# Patient Record
Sex: MALE | Race: WHITE | NOT HISPANIC OR LATINO | Employment: OTHER | URBAN - METROPOLITAN AREA
[De-identification: names, ages, dates, MRNs, and addresses within clinical notes are randomized per-mention and may not be internally consistent; named-entity substitution may affect disease eponyms.]

---

## 2019-10-04 ENCOUNTER — TRANSFERRED RECORDS (OUTPATIENT)
Dept: HEALTH INFORMATION MANAGEMENT | Facility: CLINIC | Age: 79
End: 2019-10-04

## 2023-08-08 ENCOUNTER — TRANSCRIBE ORDERS (OUTPATIENT)
Dept: OTHER | Age: 83
End: 2023-08-08

## 2023-08-08 DIAGNOSIS — C61 PROSTATE CANCER METASTATIC TO MULTIPLE SITES (H): Primary | ICD-10-CM

## 2023-08-09 ENCOUNTER — PATIENT OUTREACH (OUTPATIENT)
Dept: ONCOLOGY | Facility: CLINIC | Age: 83
End: 2023-08-09
Payer: COMMERCIAL

## 2023-08-09 NOTE — PROGRESS NOTES
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 (H)   Order: Adult Oncology/Hematology  Referral    [facility]      Comment: VIA:fax   DX:prostate cancer metastatic to multiple sites   TO: Med onc   NOTE:   PER:Maria Teresa Vang   RECORDS:[facility]      ASSESSMENT      Clinical History (per Nurse review of records provided):    83 year old male patient with metastatic prostate cancer referred for consideration of tx options including Pluvicto  Lives in [facility]     Records Location: [facility] Everywhere -  [facility]  Pertinent labs -- BOOKMARKED  Pertinent pathology report(s) -- BOOKMARKED  Pertinent imaging -- BOOKMARKED  Referring provider note(s)-- BOOKMARKED    INTERVENTION(S)                                                      August 9, 2023  OUTGOING CALL to pt, no answer. Left voicemail introducing my role as nurse navigator with [facility] oncology clinics and that we have recd the referral , ready to schedule. Requested callback to number below (Mon - Fri 8am - 4:30pm) to speak to a  to set the consult date/time/location. Explained to pt that he will also receive a call from our scheduling intake team in the next 1-2 business days     -Referral Triage Scheduling Instructions added to Hem/Onc  referral for Patient Access rep    PLAN                                                      Medical Oncology () consult     See records team's Pre-Visit encounter documentation for additional records retrieval information.    Lorin Wagner, RN, BSN, OCN  Hematology/Oncology New Patient Nurse Navigator   [facility]  [phone]

## 2023-08-14 NOTE — TELEPHONE ENCOUNTER
RECORDS STATUS - ALL OTHER DIAGNOSIS      Records Requested     August 14, 2023 3:12 PM  [id number]   Facility  Images - [facility] fx. [id number]    [facility] - [facility]  [address] [place] [address]  . [phone] / fx. [id number]   Outcome Sent a fax for images to be pushed and path send out - Amay     8/16/23 1PM received a call from Bella at [facility] Path, they are needing a new fedex label, faxed over a new label and updated previsit. 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/ ref gonsalo Henriquez/ Sched per Pt     RECORDS RECEIVED FROM: [facility]   DATE RECEIVED:    NOTES STATUS DETAILS   OFFICE NOTE from referring provider Care everywhere  [facility]   8/7/23 Maria Teresa Bonilla, TAZ      MEDICATION LIST Care everywhere     LABS     PATHOLOGY REPORTS Care everywhere  [facility]   10/4/19 LYMPH NODE, LEFT NECK, CORE BIOPSY : [id number]  *LEUKEMIA IMMUNOPHENOTYPE, TISSUE performed at [place]      FEDEX label :  [id number]   ANYTHING RELATED TO DIAGNOSIS Care everywhere     GENONOMIC TESTING     TYPE: N/A    IMAGING (NEED IMAGES & REPORT)     [facility]  Report: care everywhere   Images: req 8/14/23 - received  NM PET CT Prostate: 7/6/23  NM Bone Scan: 1/26/23 - 11/20/19  CT ABD PELVIS: 1/26/23 - 11/20/19  CT NECK: 9/26/19  US Bx Lymph node: 10/4/19

## 2023-08-17 NOTE — TELEPHONE ENCOUNTER
Pathology Received August 17, 2023 1:33 PM  [id number]     Action Taken Pathology received from [facility] and sent to 5th floor path lab to be reviewed with filled out pathology consult form.     Path: 10/4/19 LYMPH NODE, LEFT NECK, CORE BIOPSY : [id number]

## 2023-08-18 ENCOUNTER — LAB REQUISITION (OUTPATIENT)
Dept: LAB | Facility: CLINIC | Age: 83
End: 2023-08-18
Payer: COMMERCIAL

## 2023-08-18 LAB
PATH REPORT.COMMENTS IMP SPEC: ABNORMAL
PATH REPORT.COMMENTS IMP SPEC: YES
PATH REPORT.FINAL DX SPEC: ABNORMAL
PATH REPORT.GROSS SPEC: ABNORMAL
PATH REPORT.MICROSCOPIC SPEC OTHER STN: ABNORMAL
PATH REPORT.RELEVANT HX SPEC: ABNORMAL
PATH REPORT.RELEVANT HX SPEC: ABNORMAL
PATH REPORT.SITE OF ORIGIN SPEC: ABNORMAL

## 2023-08-18 PROCEDURE — 88321 CONSLTJ&REPRT SLD PREP ELSWR: CPT | Performed by: PATHOLOGY

## 2023-08-24 ENCOUNTER — PATIENT OUTREACH (OUTPATIENT)
Dept: ONCOLOGY | Facility: CLINIC | Age: 83
End: 2023-08-24
Payer: COMMERCIAL

## 2023-08-24 ENCOUNTER — PRE VISIT (OUTPATIENT)
Dept: ONCOLOGY | Facility: CLINIC | Age: 83
End: 2023-08-24
Payer: COMMERCIAL

## 2023-08-24 ENCOUNTER — ONCOLOGY VISIT (OUTPATIENT)
Dept: ONCOLOGY | Facility: CLINIC | Age: 83
End: 2023-08-24
Payer: COMMERCIAL

## 2023-08-24 VITALS
BODY MASS INDEX: 20.38 KG/M2 | OXYGEN SATURATION: 97 % | DIASTOLIC BLOOD PRESSURE: 72 MMHG | RESPIRATION RATE: 16 BRPM | HEIGHT: 66 IN | TEMPERATURE: 97.9 F | SYSTOLIC BLOOD PRESSURE: 128 MMHG | WEIGHT: 126.8 LBS | HEART RATE: 70 BPM

## 2023-08-24 DIAGNOSIS — C61 PROSTATE CANCER (H): Primary | ICD-10-CM

## 2023-08-24 LAB
ALBUMIN SERPL BCG-MCNC: 4.8 G/DL (ref 3.5–5.2)
ALP SERPL-CCNC: 64 U/L (ref 40–129)
ALT SERPL W P-5'-P-CCNC: 15 U/L (ref 0–70)
ANION GAP SERPL CALCULATED.3IONS-SCNC: 11 MMOL/L (ref 7–15)
AST SERPL W P-5'-P-CCNC: 28 U/L (ref 0–45)
BASOPHILS # BLD AUTO: 0 10E3/UL (ref 0–0.2)
BASOPHILS NFR BLD AUTO: 1 %
BILIRUB SERPL-MCNC: 0.4 MG/DL
BUN SERPL-MCNC: 18.7 MG/DL (ref 8–23)
CALCIUM SERPL-MCNC: 9.9 MG/DL (ref 8.8–10.2)
CHLORIDE SERPL-SCNC: 102 MMOL/L (ref 98–107)
CREAT SERPL-MCNC: 0.99 MG/DL (ref 0.67–1.17)
DEPRECATED HCO3 PLAS-SCNC: 26 MMOL/L (ref 22–29)
EOSINOPHIL # BLD AUTO: 0.1 10E3/UL (ref 0–0.7)
EOSINOPHIL NFR BLD AUTO: 2 %
ERYTHROCYTE [DISTWIDTH] IN BLOOD BY AUTOMATED COUNT: 13 % (ref 10–15)
GFR SERPL CREATININE-BSD FRML MDRD: 77 ML/MIN/1.73M2
GLUCOSE SERPL-MCNC: 109 MG/DL (ref 70–99)
HCT VFR BLD AUTO: 43.6 % (ref 40–53)
HGB BLD-MCNC: 15 G/DL (ref 13.3–17.7)
IMM GRANULOCYTES # BLD: 0 10E3/UL
IMM GRANULOCYTES NFR BLD: 0 %
LYMPHOCYTES # BLD AUTO: 0.8 10E3/UL (ref 0.8–5.3)
LYMPHOCYTES NFR BLD AUTO: 15 %
MCH RBC QN AUTO: 31.6 PG (ref 26.5–33)
MCHC RBC AUTO-ENTMCNC: 34.4 G/DL (ref 31.5–36.5)
MCV RBC AUTO: 92 FL (ref 78–100)
MONOCYTES # BLD AUTO: 0.3 10E3/UL (ref 0–1.3)
MONOCYTES NFR BLD AUTO: 5 %
NEUTROPHILS # BLD AUTO: 3.8 10E3/UL (ref 1.6–8.3)
NEUTROPHILS NFR BLD AUTO: 77 %
NRBC # BLD AUTO: 0 10E3/UL
NRBC BLD AUTO-RTO: 0 /100
PLATELET # BLD AUTO: 172 10E3/UL (ref 150–450)
POTASSIUM SERPL-SCNC: 4.3 MMOL/L (ref 3.4–5.3)
PROT SERPL-MCNC: 7 G/DL (ref 6.4–8.3)
RBC # BLD AUTO: 4.74 10E6/UL (ref 4.4–5.9)
SODIUM SERPL-SCNC: 139 MMOL/L (ref 136–145)
WBC # BLD AUTO: 5 10E3/UL (ref 4–11)

## 2023-08-24 PROCEDURE — 85025 COMPLETE CBC W/AUTO DIFF WBC: CPT | Performed by: INTERNAL MEDICINE

## 2023-08-24 PROCEDURE — 36415 COLL VENOUS BLD VENIPUNCTURE: CPT

## 2023-08-24 PROCEDURE — 99204 OFFICE O/P NEW MOD 45 MIN: CPT | Mod: GC | Performed by: INTERNAL MEDICINE

## 2023-08-24 PROCEDURE — 84153 ASSAY OF PSA TOTAL: CPT | Performed by: INTERNAL MEDICINE

## 2023-08-24 PROCEDURE — 36415 COLL VENOUS BLD VENIPUNCTURE: CPT | Performed by: INTERNAL MEDICINE

## 2023-08-24 PROCEDURE — 80053 COMPREHEN METABOLIC PANEL: CPT | Performed by: INTERNAL MEDICINE

## 2023-08-24 PROCEDURE — G0463 HOSPITAL OUTPT CLINIC VISIT: HCPCS | Performed by: INTERNAL MEDICINE

## 2023-08-24 ASSESSMENT — PAIN SCALES - GENERAL: PAINLEVEL: NO PAIN (0)

## 2023-08-24 NOTE — PROGRESS NOTES
[facility]  New patient history and physical  2023    Diagnosis: Metastatic castration resistant prostate cancer  Other heme/onc providers: Dr. Maria Teresa Vang ([facility])  Current Treatment and intent: referred for palliative 5th line Ksenia-177-PSMA  Treatment Summary:   20 to present: leuprolide q3m  20 to present: zoledronic acid monthly  20 to 20: docetaxel x11 cycles (PSA 10,824 ? 16.47)  21 to 22: bicalutamide  22 to 22: enzalutamide  22 to 23: cabazitaxel, carboplatin, prednisone x9 cycles    Assessment and Plan:   Liborio Lira is a 83 year old male with mCRPC referred for consideration of treatmet with Ksenia-177-PSMA.    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  This last chemotherapy regimen was tough on him and he ultimately elected to discontinue all treatment.  However, he is on board with a plan to return to his local oncologist and resume ADT with leuprolide and monthly zoledronic acid.  We also discussed his remaining lines of treatment including lutetium-177-PSMA, radium-223, and possibly some targeted treatments if his cancer is particular molecular features (PARP inhibitor if HHR, immune checkpoint inhibitor if deficient MMR).  At all of these, I recommended lutetium-177-PSMA as the likely next most effective and least toxic palliative treatment.  We  reviewed the mechanism of action of targeted radiation delivery, the possible side effects including but not limited to myelosuppression, fatigue, nausea, dry mouth.  He and his son asked excellent questions and are on board with initiating treatment.    Plan:  Labs today, CBC, CMP, PSA  Start Ksenia-177-PSMA as soon as able  Recommended continuing leuprolide q3m with his primary oncologist in [place]  Send Caris testing to evaluate for HHR and MMR/PDL1/TMB to determine later line therapy options  RTC with me TBD when infusions are able to be scheduled    This plan was discussed with Dr. Mcdonald.     Faby Toro MD   PGY6 Hematology/Oncology Fellow       Hem/onc History:   Edit Oncology History  Oncology History Overview Note   Jeannette initially presented to his PCP with a lump on the left side of his neck on 9/16/2019.  He underwent a CT of the neck 9/20/19 that showed left cervical adenopathy which was subsequently biopsied 10/4/2019 with results consistent with metastatic prostate adenocarcinoma.  At that time, his PSA was 8,970 on 10/10/2019.  He subsequently underwent a bone scan and CT AP on 11/20/2019 that showed widespread bony metastatic disease and a large pelvic mass directly invading the urinary bladder with bulky retroperitoneal and pelvic sidewall lymphadenopathy.    He started first-line palliative treatment with leuprolide and docetaxel on 1/29/2020 and was treated with 11 total cycles of docetaxel ending on 8/28/2020. A restaging CT CAP and bone scan 9/16/20 showed apparent resolution of the invasive prostate mass and lymphadenopathy, and improved but persistent diffuse osseous metastatic disease. His PSA decreased from a peak of 10,824 down to a sherrie of 16.47 on 6/9/2021 in response to this treatment.      His PSA then slowly drifted up to 30.97 on 11/12/2021 at which time palliative second line bicalutamide was added to leuprolide.  His PSA unfortunately continued to rise to 52.30 on 2/14/2022 for  which she was switched from bicalutamide to palliative third line enzalutamide.  His PSA decreased to a sherrie of 16.83 on 7/15/2022 in response to enzalutamide, then started to rise again to 28.72 on 8/11/2022 for which he was changed from enzalutamide to palliative fourth line cabazitaxel, carboplatin, and prednisone.  He received a total of 9 cycles of cabazitaxel, carboplatin, and prednisone ending on 4/24/2023 during which time his PSA was stable in the 40-50 range, however started to increase to the 70-90 range after completing chemotherapy.  He therefore underwent a PSMA PET-CT scan on 7/6/2023 that showed hypermetabolic activity in the majority the prostate and widespread osseous metastatic disease for which she was referred to [facility] for lutetium-177-PSMA therapy.     Prostate cancer (H)   10/4/2019 Pathology    Left neck lymph node biopsy  [facility] overread:  Final Diagnosis   CASE FROM [place], [place] ([id number], OBTAINED 10/04/2019):  LYMPH NODE, LEFT NECK, CORE BIOPSY:  -METASTATIC PROSTATE ADENOCARCINOMA, see comment.      10/10/2019 Tumor Markers    Date PSA Treatment   10/10/2019 8,970.00 High      1/29/2020 10,824.68 High  Started leuprolide, docetaxel    2/19/2020 3,497.46 High       3/11/2020 1,256.01 High       4/2/2020 727.72 High     4/24/2020 633.57 High     5/15/2020 471.45 High     6/4/2020 332.91 High     6/26/2020 229.82 High     7/17/2020 185.36 High     8/7/2020 149.70 High     8/28/2020 124.63 High  Docetaxel x11 cycles completed   12/4/2020 42.69 High     3/15/2021 21.50 High     4/12/2021 17.52 High     5/14/2021 16.84 High     6/9/2021 16.47 High     8/20/2021 19.90 High     9/20/2021 22.72 High     10/20/2021 29.24 High     11/12/2021 30.97 High  Added bicalutamide to leuprolide   12/16/2021 29.96 High     1/18/2022 35.33 High     2/14/2022 52.30 High  Changed bicalutamide to enzalutamide   4/22/2022 19.22 High     5/20/2022 17.88 High     6/17/2022 17.92 High     7/15/2022 16.83  High     8/12/2022 19.11 High     9/12/2022 22.04 High     10/11/2022 25.04 High     11/8/2022 28.72 High  Changed enzalutamide to cabazitaxel, carboplatin   11/29/2022 42.73 High     12/20/2022 53.16 High     1/10/2023 52.72 High     1/31/2023 47.75 High     3/14/2023 47.78 High     4/3/2023 50.89 High     4/24/2023 93.83 High  Cabazitaxel, carboplatin x9 cycles completed   5/15/2023 73.02 High     6/26/2023 81.78 High     8/7/2023 117.08 High          7/6/2023 Imaging    PSMA PET  IMPRESSION:   1. Hypermetabolic activity involving a majority of the prostate concerning for   residual disease.   2. Hypermetabolic activity involving numerous osseous structures as discussed   above consistent with metastatic disease.      8/31/2023 -  Chemotherapy    IP ONC Prostate Cancer (PMSA+) - Lutetium Ksenia-177 vipivotide tetraxetan  Plan Provider: Faby Toro MD  Treatment goal: Palliative  Line of treatment: [No plan line of treatment]       History of Present Illness/Interval History:   Jeannette presented to clinic today with his son Natanael.  He reports he has been doing very well since stopping all therapy in April 2023.  Tells me he felt very rundown with the chemotherapy regimen of cabazitaxel and carboplatin, and though he was able to make it through 9 cycles he decided to stop this because it was affecting his quality of life.  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  His son Natanael is a  and has no interest in buying his dairy farm, and for now he is not planning on selling anyone else.  He tells me he is very careful to avoid falls given his known bony metastatic disease and risk of fracture, unfortunately he has not had any potential pain or fractures thus far.  He does note  "that he stopped his leuprolide and Zometa    Subjective   Past Medical History:   Diagnosis Date    Prostate cancer (H) 08/24/2023     Past Surgical History:   Procedure Laterality Date    IR LYMPH NODE BIOPSY  10/4/2019     Social History     Socioeconomic History    Marital status: Single     Spouse name: None    Number of children: None    Years of education: None    Highest education level: None   Tobacco Use    Smoking status: Never     Passive exposure: Never    Smokeless tobacco: Current     Types: Chew, Snuff   Substance and Sexual Activity    Alcohol use: Yes    Drug use: Never      No family history on file.    No current outpatient medications on file.     No current facility-administered medications for this visit.        Objective     Physical Exam:   Blood pressure 128/72, pulse 70, temperature 97.9  F (36.6  C), temperature source Oral, resp. rate 16, height 1.67 m (5' 5.75\"), weight 57.5 kg (126 lb 12.8 oz), SpO2 97 %.  Physical Exam  Constitutional:       General: He is not in acute distress.     Appearance: He is not ill-appearing.   HENT:      Head: Normocephalic and atraumatic.      Mouth/Throat:      Mouth: Mucous membranes are moist.   Eyes:      General: No scleral icterus.     Extraocular Movements: Extraocular movements intact.   Cardiovascular:      Rate and Rhythm: Normal rate and regular rhythm.   Pulmonary:      Effort: Pulmonary effort is normal. No respiratory distress.   Abdominal:      General: There is no distension.      Palpations: Abdomen is soft.   Musculoskeletal:      Right lower leg: No edema.      Left lower leg: No edema.   Skin:     General: Skin is warm and dry.      Coloration: Skin is not jaundiced.   Neurological:      Mental Status: He is alert. Mental status is at baseline.   Psychiatric:         Mood and Affect: Mood normal.         Data:     I personally reviewed the following studies:  Recent Labs   Lab Test 08/24/23  1651   WBC 5.0   % NEUTROPHILS 77   HEMOGLOBIN " 15.0   PLATELET COUNT 172     No lab results found.  No lab results found.  No lab results found.      Data from Care Everywhere:  Date PSA Treatment   10/10/2019 8,970.00 High      1/29/2020 10,824.68 High  Started leuprolide, docetaxel    2/19/2020 3,497.46 High       3/11/2020 1,256.01 High       4/2/2020 727.72 High     4/24/2020 633.57 High     5/15/2020 471.45 High     6/4/2020 332.91 High     6/26/2020 229.82 High     7/17/2020 185.36 High     8/7/2020 149.70 High     8/28/2020 124.63 High  Docetaxel x11 cycles completed   12/4/2020 42.69 High     3/15/2021 21.50 High     4/12/2021 17.52 High     5/14/2021 16.84 High     6/9/2021 16.47 High     8/20/2021 19.90 High     9/20/2021 22.72 High     10/20/2021 29.24 High     11/12/2021 30.97 High  Added bicalutamide to leuprolide   12/16/2021 29.96 High     1/18/2022 35.33 High     2/14/2022 52.30 High  Changed bicalutamide to enzalutamide   4/22/2022 19.22 High     5/20/2022 17.88 High     6/17/2022 17.92 High     7/15/2022 16.83 High     8/12/2022 19.11 High     9/12/2022 22.04 High     10/11/2022 25.04 High     11/8/2022 28.72 High  Changed enzalutamide to cabazitaxel, carboplatin   11/29/2022 42.73 High     12/20/2022 53.16 High     1/10/2023 52.72 High     1/31/2023 47.75 High     3/14/2023 47.78 High     4/3/2023 50.89 High     4/24/2023 93.83 High  Cabazitaxel, carboplatin x9 cycles completed   5/15/2023 73.02 High     6/26/2023 81.78 High     8/7/2023 117.08 High       Labs from 8/7/23 in care everywhere  COMPLETE BLOOD COUNT AND DIFFERENTIAL  Specimen: Blood - Venous blood (substance)   Ref Range & Units 2 wk ago   WBC Count, Corrected 3.9 - 11.9 10(3)/uL 6.0   RBC Count 4.08 - 5.79 10(6)/uL 4.51   Hemoglobin 13.1 - 17.1 g/dL 14.6   Hematocrit 38.7 - 51.4 % 42.0   MCV 82.9 - 100.6 fL 93.1   MCH 27.6 - 33.2 pg 32.4   MCHC 32.0 - 36.0 g/dL 34.8   RDW 10.0 - 16.2 % 13.2   Platelets 179 - 450 10(3)/uL 173 Low    MPV 7.4 - 10.4 fL 6.9 Low    % Neutrophils 43.0  - 80.0 % 79.6   % Lymphocytes 16.0 - 49.0 % 13.4 Low    % Monocytes 0.0 - 10.0 % 5.3   % Eosinophils 0.0 - 7.0 % 0.9   % Basophils 0.0 - 2.0 % 0.8   Abs Neutrophils 1.6 - 8.1 10(3)/uL 4.8   Abs Lymphocytes 0.9 - 3.5 10(3)/uL 0.8 Low    Abs Monocytes 0.0 - 1.1 10(3)/uL 0.3   Abs Eosinophils 0.0 - 0.8 10(3)/uL 0.1   Abs Basophils 0.0 - 0.2 10(3)/uL 0.1     COMPREHENSIVE METABOLIC PANEL  Specimen: Blood - Venous blood (substance)     Ref Range & Units 2 wk ago Comments   Sodium 136 - 146 mmol/L 139    Potassium 3.5 - 5.1 mmol/L 4.2    Chloride 98 - 107 mmol/L 105    CO2 22 - 29 mmol/L 23    Anion Gap 10.0 - 20.0 mmol/L 15.2    Creatinine 0.72 - 1.25 mg/dL 0.95    Blood Urea Nitrogen 10.0 - 20.0 mg/dL 16.3    BUN/Creatinine Ratio 11.70 - 22.90 ratio 17.16    Calcium 8.6 - 10.5 mg/dL 9.5    Glucose 70 - 100 mg/dL 77    eGFR >=60 mL/min/1.73m(2) >60 Calculation based on the Chronic Kidney Disease Epidemiology Collaboration (CKD-EPI) equation refit without adjustment for race.  GFR reference range is not established in patients >70 years old.   Total Protein 6.0 - 8.0 g/dL 7.0    Albumin 3.4 - 4.8 g/dL 4.5    Globulin 2.0 - 3.5 g/dL 2.5    Albumin/Globulin Ratio 1.0 - 2.0 1.8    Aspartate Aminotransferase (AST) 5 - 41 U/L 32    Alanine Aminotransferase (ALT) 8 - 45 U/L 25    Alkaline Phosphatase 50 - 136 U/L 63    Bilirubin, Total 0.2 - 1.2 mg/dL 0.5    eCrCl (Rx) - Adults mL/min 50.5    Fasting Status  No        No results found for this or any previous visit (from the past 24 hour(s)).

## 2023-08-24 NOTE — PROGRESS NOTES
[facility]: Cancer Care Initial Note                                    Discussion with Patient:                                                      Introduced self and role of RN Care Coordinator at [facility]. Provided my contact information, [facility] phone number (which has options to talk with a Nurse available 24/7 - triage and RNCC via this option during business hours).     Reviewed [facility] care team members including midlevel providers in oncology dept and Dr. Toro and Dr. Mcdonald  usual clinic hours.    Consent to communicate form signed and sent to scanning    Pt voiced understanding and appreciation of above information and denies any further questions, and he/she understands that I will follow and provide coordination as needed       Assessment:                                                      Initial  Current living arrangement:: I live alone  Informal Support system:: Children  Mobility Status: Independent  Transportation means:: Regular car  Medication adherence problem (GOAL):: No  Medication side effects suspected:: No  Pain Score: No Pain (0)      Intervention/Education provided during outreach:                                                     See above    Patient to follow up as scheduled at next appt    Yohana ADHIKARI, RN   Care Coordinator  [facility]

## 2023-08-25 LAB — PSA SERPL DL<=0.01 NG/ML-MCNC: 173.5 NG/ML

## 2023-08-28 ENCOUNTER — PATIENT OUTREACH (OUTPATIENT)
Dept: ONCOLOGY | Facility: CLINIC | Age: 83
End: 2023-08-28
Payer: COMMERCIAL

## 2023-08-28 NOTE — PROGRESS NOTES
[facility]: Cancer Care                                                                                        RN sent for Caris testing       Yohana LUCION, RN   Care Coordinator  [facility]

## 2023-08-30 ENCOUNTER — TELEPHONE (OUTPATIENT)
Dept: NUCLEAR MEDICINE | Facility: CLINIC | Age: 83
End: 2023-08-30
Payer: COMMERCIAL

## 2023-08-31 DIAGNOSIS — C61 PROSTATE CANCER (H): Primary | ICD-10-CM

## 2023-08-31 RX ORDER — ONDANSETRON 4 MG/1
8 TABLET, FILM COATED ORAL
Status: CANCELLED | OUTPATIENT
Start: 2023-08-31

## 2023-08-31 RX ORDER — EPINEPHRINE 1 MG/ML
0.3 INJECTION, SOLUTION INTRAMUSCULAR; SUBCUTANEOUS EVERY 5 MIN PRN
Status: CANCELLED | OUTPATIENT
Start: 2023-08-31

## 2023-08-31 RX ORDER — PROCHLORPERAZINE MALEATE 5 MG
5-10 TABLET ORAL EVERY 6 HOURS PRN
Status: CANCELLED
Start: 2023-08-31

## 2023-08-31 RX ORDER — METHYLPREDNISOLONE SODIUM SUCCINATE 125 MG/2ML
125 INJECTION, POWDER, LYOPHILIZED, FOR SOLUTION INTRAMUSCULAR; INTRAVENOUS
Status: CANCELLED
Start: 2023-08-31

## 2023-08-31 RX ORDER — LORAZEPAM 2 MG/ML
.5-1 INJECTION INTRAMUSCULAR EVERY 6 HOURS PRN
Status: CANCELLED | OUTPATIENT
Start: 2023-08-31

## 2023-08-31 RX ORDER — LORAZEPAM 0.5 MG/1
.5-1 TABLET ORAL EVERY 6 HOURS PRN
Status: CANCELLED
Start: 2023-08-31

## 2023-08-31 RX ORDER — DIPHENHYDRAMINE HYDROCHLORIDE 50 MG/ML
50 INJECTION INTRAMUSCULAR; INTRAVENOUS
Status: CANCELLED
Start: 2023-08-31

## 2023-08-31 RX ORDER — MEPERIDINE HYDROCHLORIDE 25 MG/ML
25 INJECTION INTRAMUSCULAR; INTRAVENOUS; SUBCUTANEOUS EVERY 30 MIN PRN
Status: CANCELLED | OUTPATIENT
Start: 2023-08-31

## 2023-08-31 RX ORDER — ALBUTEROL SULFATE 90 UG/1
1-2 AEROSOL, METERED RESPIRATORY (INHALATION)
Status: CANCELLED
Start: 2023-08-31

## 2023-08-31 RX ORDER — ALBUTEROL SULFATE 0.83 MG/ML
2.5 SOLUTION RESPIRATORY (INHALATION)
Status: CANCELLED | OUTPATIENT
Start: 2023-08-31

## 2023-09-13 ENCOUNTER — PATIENT OUTREACH (OUTPATIENT)
Dept: ONCOLOGY | Facility: CLINIC | Age: 83
End: 2023-09-13
Payer: COMMERCIAL

## 2023-09-13 DIAGNOSIS — C61 PROSTATE CANCER (H): Primary | ICD-10-CM

## 2023-09-15 ENCOUNTER — ALLIED HEALTH/NURSE VISIT (OUTPATIENT)
Dept: ONCOLOGY | Facility: CLINIC | Age: 83
End: 2023-09-15

## 2023-09-15 ENCOUNTER — HOSPITAL ENCOUNTER (OUTPATIENT)
Dept: NUCLEAR MEDICINE | Facility: CLINIC | Age: 83
Setting detail: NUCLEAR MEDICINE
Discharge: HOME | End: 2023-09-15
Attending: INTERNAL MEDICINE | Admitting: INTERNAL MEDICINE
Payer: COMMERCIAL

## 2023-09-15 DIAGNOSIS — C61 PROSTATE CA (H): Primary | ICD-10-CM

## 2023-09-15 DIAGNOSIS — C61 PROSTATE CANCER (H): Primary | ICD-10-CM

## 2023-09-15 PROCEDURE — A9607 HC RX 344: HCPCS | Mod: JZ | Performed by: INTERNAL MEDICINE

## 2023-09-15 PROCEDURE — 79101 NUCLEAR RX IV ADMIN: CPT

## 2023-09-15 PROCEDURE — 79101 NUCLEAR RX IV ADMIN: CPT | Mod: 26 | Performed by: RADIOLOGY

## 2023-09-15 PROCEDURE — 344N000001 HC RX 344: Mod: JZ | Performed by: INTERNAL MEDICINE

## 2023-09-15 PROCEDURE — 999N000128 HC STATISTIC PERIPHERAL IV START W/O US GUIDANCE

## 2023-09-15 RX ORDER — LORAZEPAM 0.5 MG/1
.5-1 TABLET ORAL EVERY 6 HOURS PRN
Status: DISCONTINUED | OUTPATIENT
Start: 2023-09-15 | End: 2023-09-16 | Stop reason: HOSPADM

## 2023-09-15 RX ORDER — LORAZEPAM 2 MG/ML
.5-1 INJECTION INTRAMUSCULAR EVERY 6 HOURS PRN
Status: DISCONTINUED | OUTPATIENT
Start: 2023-09-15 | End: 2023-09-16 | Stop reason: HOSPADM

## 2023-09-15 RX ORDER — EPINEPHRINE 1 MG/ML
0.3 INJECTION, SOLUTION, CONCENTRATE INTRAVENOUS EVERY 5 MIN PRN
Status: DISCONTINUED | OUTPATIENT
Start: 2023-09-15 | End: 2023-09-16 | Stop reason: HOSPADM

## 2023-09-15 RX ORDER — ONDANSETRON 8 MG/1
8 TABLET, FILM COATED ORAL
Status: DISCONTINUED | OUTPATIENT
Start: 2023-09-15 | End: 2023-09-16 | Stop reason: HOSPADM

## 2023-09-15 RX ORDER — ALBUTEROL SULFATE 0.83 MG/ML
2.5 SOLUTION RESPIRATORY (INHALATION)
Status: DISCONTINUED | OUTPATIENT
Start: 2023-09-15 | End: 2023-09-16 | Stop reason: HOSPADM

## 2023-09-15 RX ORDER — METHYLPREDNISOLONE SODIUM SUCCINATE 125 MG/2ML
125 INJECTION, POWDER, LYOPHILIZED, FOR SOLUTION INTRAMUSCULAR; INTRAVENOUS
Status: DISCONTINUED | OUTPATIENT
Start: 2023-09-15 | End: 2023-09-16 | Stop reason: HOSPADM

## 2023-09-15 RX ORDER — MEPERIDINE HYDROCHLORIDE 25 MG/ML
25 INJECTION INTRAMUSCULAR; INTRAVENOUS; SUBCUTANEOUS EVERY 30 MIN PRN
Status: DISCONTINUED | OUTPATIENT
Start: 2023-09-15 | End: 2023-09-16 | Stop reason: HOSPADM

## 2023-09-15 RX ORDER — DIPHENHYDRAMINE HYDROCHLORIDE 50 MG/ML
50 INJECTION INTRAMUSCULAR; INTRAVENOUS
Status: DISCONTINUED | OUTPATIENT
Start: 2023-09-15 | End: 2023-09-16 | Stop reason: HOSPADM

## 2023-09-15 RX ORDER — PROCHLORPERAZINE MALEATE 5 MG
5 TABLET ORAL EVERY 6 HOURS PRN
Status: DISCONTINUED | OUTPATIENT
Start: 2023-09-15 | End: 2023-09-16 | Stop reason: HOSPADM

## 2023-09-15 RX ORDER — ALBUTEROL SULFATE 90 UG/1
1-2 AEROSOL, METERED RESPIRATORY (INHALATION)
Status: DISCONTINUED | OUTPATIENT
Start: 2023-09-15 | End: 2023-09-16 | Stop reason: HOSPADM

## 2023-09-15 RX ADMIN — LUTETIUM LU 177 VIPIVOTIDE TETRAXETAN 200 MILLICURIE: 27 INJECTION, SOLUTION INTRAVENOUS at 14:56

## 2023-09-25 NOTE — PROGRESS NOTES
I had an opportunity to speak with Mr. Lira today regarding the Astrin CTC study.      The consent form, including purpose, risks and benefits, was reviewed with , and all questions were answered before he signed the consent form. The patient consented and understands the study involves up to three serial 50 mL blood draws.       I, Donald Adams, discussed the study with the patient, going through the informed consent form page by page and obtained consent. A copy of the signed form was provided to the patient. No procedures specific to this study were performed prior to the patient signing the consent form.     Elligibility was determined and signed off by Donald Adams prior to blood collection.       Consent and combined HIPAA Version Date: 16 AUG 22 [facility] IRB approval: 4 APR 2023     Consent and combined HIPAA obtained by: Donald Adams   Date: 15 SEP 23

## 2023-10-23 NOTE — PROGRESS NOTES
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 Maria Teresa Vang ([facility])  Current Treatment and intent: referred for palliative 5th line Ksenia-177-PSMA    Treatment Summary:   1/29/20 to present: leuprolide q3m  1/29/20 to present: zoledronic acid monthly  1/29/20 to 8/28/20: docetaxel x11 cycles (PSA 10,824 ? 16.47)  11/16/21 to 2/14/22: bicalutamide  2/17/22 to 11/8/22: enzalutamide  11/8/22 to 4/24/23: cabazitaxel, carboplatin, prednisone x9 cycles  9/15/23 D1C1 Pluvicto 200 millicurie    Hem/onc History:   Edit Oncology History  Oncology History Overview Note   Jeannette initially presented to his PCP with a lump on the left side of his neck on 9/16/2019.  He underwent a CT of the neck 9/20/19 that showed left cervical adenopathy which was subsequently biopsied 10/4/2019 with results consistent with metastatic prostate adenocarcinoma.  At that time, his PSA was 8,970 on 10/10/2019.  He subsequently underwent a bone scan and CT AP on 11/20/2019 that showed widespread bony metastatic disease and a large pelvic mass directly invading the urinary bladder with bulky retroperitoneal and pelvic sidewall lymphadenopathy.    He started first-line palliative treatment with leuprolide and docetaxel on 1/29/2020 and was treated with 11 total cycles of docetaxel ending on 8/28/2020. A restaging CT CAP and bone scan 9/16/20 showed apparent resolution of the invasive prostate mass and lymphadenopathy, and improved but persistent diffuse osseous metastatic disease. His PSA decreased from a peak of 10,824 down to a sherrie of 16.47 on 6/9/2021 in response to this treatment.      His PSA then slowly drifted up to 30.97 on 11/12/2021 at which time palliative second line bicalutamide was added to leuprolide.  His PSA  unfortunately continued to rise to 52.30 on 2/14/2022 for which she was switched from bicalutamide to palliative third line enzalutamide.  His PSA decreased to a sherrie of 16.83 on 7/15/2022 in response to enzalutamide, then started to rise again to 28.72 on 8/11/2022 for which he was changed from enzalutamide to palliative fourth line cabazitaxel, carboplatin, and prednisone.  He received a total of 9 cycles of cabazitaxel, carboplatin, and prednisone ending on 4/24/2023 during which time his PSA was stable in the 40-50 range, however started to increase to the 70-90 range after completing chemotherapy.  He therefore underwent a PSMA PET-CT scan on 7/6/2023 that showed hypermetabolic activity in the majority the prostate and widespread osseous metastatic disease for which she was referred to [facility] for lutetium-177-PSMA therapy.     Prostate cancer (H)   10/4/2019 Pathology    Left neck lymph node biopsy  [facility] overread:  Final Diagnosis   CASE FROM [place], [place] ([id number], OBTAINED 10/04/2019):  LYMPH NODE, LEFT NECK, CORE BIOPSY:  -METASTATIC PROSTATE ADENOCARCINOMA, see comment.      10/10/2019 Tumor Markers    Date PSA Treatment   10/10/2019 8,970.00 High      1/29/2020 10,824.68 High  Started leuprolide, docetaxel    2/19/2020 3,497.46 High       3/11/2020 1,256.01 High       4/2/2020 727.72 High     4/24/2020 633.57 High     5/15/2020 471.45 High     6/4/2020 332.91 High     6/26/2020 229.82 High     7/17/2020 185.36 High     8/7/2020 149.70 High     8/28/2020 124.63 High  Docetaxel x11 cycles completed   12/4/2020 42.69 High     3/15/2021 21.50 High     4/12/2021 17.52 High     5/14/2021 16.84 High     6/9/2021 16.47 High     8/20/2021 19.90 High     9/20/2021 22.72 High     10/20/2021 29.24 High     11/12/2021 30.97 High  Added bicalutamide to leuprolide   12/16/2021 29.96 High     1/18/2022 35.33 High     2/14/2022 52.30 High  Changed bicalutamide to enzalutamide   4/22/2022 19.22 High    "  5/20/2022 17.88 High     6/17/2022 17.92 High     7/15/2022 16.83 High     8/12/2022 19.11 High     9/12/2022 22.04 High     10/11/2022 25.04 High     11/8/2022 28.72 High  Changed enzalutamide to cabazitaxel, carboplatin   11/29/2022 42.73 High     12/20/2022 53.16 High     1/10/2023 52.72 High     1/31/2023 47.75 High     3/14/2023 47.78 High     4/3/2023 50.89 High     4/24/2023 93.83 High  Cabazitaxel, carboplatin x9 cycles completed   5/15/2023 73.02 High     6/26/2023 81.78 High     8/7/2023 117.08 High          7/6/2023 Imaging    PSMA PET  IMPRESSION:   1. Hypermetabolic activity involving a majority of the prostate concerning for   residual disease.   2. Hypermetabolic activity involving numerous osseous structures as discussed   above consistent with metastatic disease.      9/15/2023 -  Chemotherapy    IP ONC Prostate Cancer (PMSA+) - Lutetium Ksenia-177 vipivotide tetraxetan  Plan Provider: Faby Toro MD  Treatment goal: Palliative  Line of treatment: [No plan line of treatment]       History of Present Illness/Interval History:   Liborio is alone for 10/24/2023 visit and reports that he is feeling good and doing well.  He reports no issues from Pluvicto received on 9/15/203 (1st dose). His energy level has improved since stopping chemotherapy in April 2023. On chart review, he is to receive Lupron shots every 3 months. He doesn't think that he as received Lupron shot lately. He has appointment with Oncologist in [place] on 11/6/23.     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 Has complaints of right thumb \"pins and needles\" he thinks it is from milking cows.    Concerned about \"lump in vein\" on right forearm. That he thinks it was larger, but states that he bumped it against something that caused bruising initially, and then reduction in size of the \"lump.\" He has had this \"lump\" for at least 1 year. He denies pain, swelling or erythema.    He is experiencing some dry " mouth, states that he is drinking more fluids that seem to help.   Reports some left eye watering that he attributes to sinus congestion an pressure. Takes ASA and Benadryl almost daily which helps with is symptoms. No reports of dry eyes.        He denies recent falls.  He denies any new bone pain or tenderness. No nausea or vomiting. No fever or chills, no night sweats. No urinary frequency, retention or urgency; no hematuria. Denies constipation or diarrhea.    Subjective   Past Medical History:   Diagnosis Date    Prostate cancer (H) 2023     Past Surgical History:   Procedure Laterality Date    IR LYMPH NODE BIOPSY  10/4/2019     Social History     Socioeconomic History    Marital status: Single   Tobacco Use    Smoking status: Never     Passive exposure: Never    Smokeless tobacco: Current     Types: Chew, Snuff   Substance and Sexual Activity    Alcohol use: Yes    Drug use: Never      Family History   Problem Relation Age of Onset    No Known Problems Son        Current Outpatient Medications   Medication Sig    cod liver oil CAPS capsule Take 2 capsules by mouth daily    cyanocobalamin (VITAMIN B-12) 100 MCG tablet Take 100 mcg by mouth daily    diphenhydrAMINE (BENADRYL) 25 MG capsule Take 25 mg by mouth every 6 hours as needed for allergies    magnesium 250 MG tablet Take 1 tablet by mouth daily    pyridOXINE (VITAMIN B6) 100 MG TABS Take 25 mg by mouth daily    vitamin C with B complex (B COMPLEX-C) tablet Take 1 tablet by mouth daily     No current facility-administered medications for this visit.        Objective   ECO-1  Physical Exam:   Blood pressure 130/89, pulse 72, temperature 97.6  F (36.4  C), temperature source Oral, resp. rate 16, weight 58.6 kg (129 lb 1.6 oz), SpO2 96%.  Physical Exam  Constitutional:       General: He is not in acute distress.     Appearance: He is not ill-appearing.   HENT:      Head: Normocephalic and atraumatic.      Mouth/Throat:      Mouth: Mucous membranes  are moist.   Eyes:      General: No scleral icterus.     Extraocular Movements: Extraocular movements intact.   Cardiovascular:      Rate and Rhythm: Normal rate and regular rhythm.   Pulmonary:      Effort: Pulmonary effort is normal. No respiratory distress.   Abdominal:      General: There is no distension.      Palpations: Abdomen is soft.   Musculoskeletal:      Right hand: No swelling or tenderness. Normal range of motion.      Right lower leg: No edema.      Left lower leg: No edema.      Comments: Right hand: Mild thenar atrophy     Skin:     General: Skin is warm and dry.      Coloration: Skin is not jaundiced.      Comments: Right forearm: approximate 2 mm moblie mass cephalic vein; no erythema, edema or warmth noted   Neurological:      Mental Status: He is alert and oriented to person, place, and time.      Sensory: Sensation is intact.      Motor: Motor function is intact.      Gait: Gait is intact.   Psychiatric:         Mood and Affect: Mood normal.     Data:   CMP 10/16/23:  Component  Ref Range & Units 9 d ago Comments   Sodium  136 - 146 mmol/L 140    Potassium  3.5 - 5.1 mmol/L 4.1    Chloride  98 - 107 mmol/L 106    CO2  22 - 29 mmol/L 24    Anion Gap  10.0 - 20.0 mmol/L 14.1    Creatinine  0.72 - 1.25 mg/dL 0.86    Blood Urea Nitrogen  10.0 - 20.0 mg/dL 21.5 High     BUN/Creatinine Ratio  11.70 - 22.90 ratio 25.00 High     Calcium, Total  8.6 - 10.5 mg/dL 9.4    Glucose  70 - 100 mg/dL 76    eGFR  >=60 mL/min/1.73m(2) >60 Calculation based on the Chronic Kidney Disease Epidemiology Collaboration (CKD-EPI) equation refit without adjustment for race.  GFR reference range is not established in patients >70 years old.   Total Protein  6.0 - 8.0 g/dL 6.4    Albumin  3.4 - 4.8 g/dL 4.2    Globulin  2.0 - 3.5 g/dL 2.2    Albumin/Globulin Ratio  1.0 - 2.0 1.9    Aspartate Aminotransferase (AST)  5 - 41 U/L 26    Alanine Aminotransferase (ALT)  8 - 45 U/L 22    Alkaline Phosphatase  50 - 136 U/L 69     Bilirubin, Total  0.2 - 1.2 mg/dL 0.4    eCrCl (Rx) - Adults  mL/min 54.8    Fasting Status N/A        CBC 10/16/23  WBC Count, Corrected  3.9 - 11.9 10(3)/uL 5.1   RBC Count  4.08 - 5.79 10(6)/uL 4.24   Hemoglobin  13.1 - 17.1 g/dL 13.7   Hematocrit  38.7 - 51.4 % 39.3   MCV  82.9 - 100.6 fL 92.7   MCH  27.6 - 33.2 pg 32.2   MCHC  32.0 - 36.0 g/dL 34.7   RDW  10.0 - 16.2 % 14.4   Platelets  179 - 450 10(3)/uL 167 Low    MPV  7.4 - 10.4 fL 6.6 Low    % Neutrophils  43.0 - 80.0 % 79.3   % Lymphocytes  16.0 - 49.0 % 11.7 Low    % Monocytes  0.0 - 10.0 % 6.8   % Eosinophils  0.0 - 7.0 % 1.6   % Basophils  0.0 - 2.0 % 0.6   Abs Neutrophils  1.6 - 8.1 10(3)/uL 4.1   Abs Lymphocytes  0.9 - 3.5 10(3)/uL 0.6 Low    Abs Monocytes  0.0 - 1.1 10(3)/uL 0.4   Abs Eosinophils  0.0 - 0.8 10(3)/uL 0.1   Abs Basophils  0.0 - 0.2 10(3)/uL 0.0     PSA:    Component      Latest Ref Rng 8/24/2023  4:51 PM   PSA Tumor Marker      ng/mL 173.50        10/16/2023  Component  Ref Range & Units 8 d ago   PSA, Total  0.00 - 7.20 ng/mL 153.55 High    [facility] LABORATORY SERVICES [facility]     ASSESSMENT AND PLAN  Stage IV castration resistant prostate adenocarcinoma:  Liborio Lira is a 83 year old male with 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 He is doing well, with no significant side effects from first cycle of Pluvicto. He is having some dry mouth, but endorses taking Benadryl daily which may also have a drying affect. Discussed re-initiation of Lupron shots as recommend. These will likely be life long. Offered to to follow up with him via video visits going forward, however, he prefers to continue with in person visits.   - D1C2 Pluvicto 10/31/2023, labs appropriate to proceed. PSA on 08/24/23 prior to cycle 1 pluvicto, 173.50 --> down trending to 153.55 after 1 dose.   - Appointment  "with Dr. Dr. Maria Teresa Vang ([facility]) 11/6/2023;recommend he receive lupron shot at that time. Monthly Zoledronic acid with Dr. Vang locally.   - Follow up with Dr. Mcdonald 12/4/2023 with pluvicto on 12/13/23. Labs locally 2 weeks prior to pluvicto dose.     Sinus congestion  Patient reports some left nasal congestion to include drainage of right eye. States that he is taking ASA and Benadryl for relief almost daily. Recommend that he discontinue benadryl and suggested that he trial Claritin. Concerns for dry mouth, as well as anticholinergic effects of benadryl, in addition to fall risk. Patient endorsed treatment plan. Will monitor symptoms of dry mouth after discontinuation of Benadryl vs Pluvicto. Recommend Biotene for dry mouth symptoms and increase fluid intake.   - Stop taking Benadryl  - Start Claritin 10 mg daily, if sinus congestion continues discussed return to PCP for further workup.     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  Physical exam positive for mild thenar atrophy, no decreased sensation to light touch.  Discussed with patient that the etiology of his symptoms  may be related to carpal tunnel syndrome. Recommend that he trial hand split hs and follow up with primary care should his symptoms not improve with splinting. He endorsed treatment plan.    Right Forearm Venous Lump  - unclear etiology, slowly improving.   - Offered to U/S the lesion for reassurance, he declined at this time. Red Flags: increased swelling, pain, erythema or limited range of motion would warrant further evaluation.   - Will continue to monitor    REGGIE Ramirez Student    45 minutes spent on the date of the encounter doing chart review, review of outside records, review of test results, patient visit, and documentation     The patient was seen in conjunction with REGGIE Ramirez Student who served as a scribe for today's visit. I have reviewed and " edited the above note, and agree with the above findings and plan.      Ofe Farr PA-C

## 2023-10-24 ENCOUNTER — ONCOLOGY VISIT (OUTPATIENT)
Dept: ONCOLOGY | Facility: CLINIC | Age: 83
End: 2023-10-24
Payer: COMMERCIAL

## 2023-10-24 VITALS
SYSTOLIC BLOOD PRESSURE: 130 MMHG | TEMPERATURE: 97.6 F | DIASTOLIC BLOOD PRESSURE: 89 MMHG | RESPIRATION RATE: 16 BRPM | WEIGHT: 129.1 LBS | HEART RATE: 72 BPM | BODY MASS INDEX: 21 KG/M2 | OXYGEN SATURATION: 96 %

## 2023-10-24 DIAGNOSIS — C61 PROSTATE CANCER (H): Primary | ICD-10-CM

## 2023-10-24 PROCEDURE — G0463 HOSPITAL OUTPT CLINIC VISIT: HCPCS

## 2023-10-24 PROCEDURE — 99215 OFFICE O/P EST HI 40 MIN: CPT

## 2023-10-24 RX ORDER — MULTIVITAMIN WITH IRON
1 TABLET ORAL DAILY
COMMUNITY

## 2023-10-24 RX ORDER — DIPHENHYDRAMINE HCL 25 MG
25 CAPSULE ORAL EVERY 6 HOURS PRN
COMMUNITY

## 2023-10-24 RX ORDER — UBIDECARENONE 75 MG
100 CAPSULE ORAL DAILY
COMMUNITY

## 2023-10-24 ASSESSMENT — PAIN SCALES - GENERAL: PAINLEVEL: NO PAIN (0)

## 2023-10-24 NOTE — LETTER
10/24/2023         RE: Liborio Lira  [address] [facility] [address]        Dear Colleague,    Thank you for referring your patient, Liborio Lira, to the [facility]. Please see a copy of my visit note below.    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 Maria Teresa Vang ([facility])  Current Treatment and intent: referred for palliative 5th line Ksenia-177-PSMA    Treatment Summary:   1/29/20 to present: leuprolide q3m  1/29/20 to present: zoledronic acid monthly  1/29/20 to 8/28/20: docetaxel x11 cycles (PSA 10,824 ? 16.47)  11/16/21 to 2/14/22: bicalutamide  2/17/22 to 11/8/22: enzalutamide  11/8/22 to 4/24/23: cabazitaxel, carboplatin, prednisone x9 cycles  9/15/23 D1C1 Pluvicto 200 millicurie    Hem/onc History:   Edit Oncology History  Oncology History Overview Note   Jeannette initially presented to his PCP with a lump on the left side of his neck on 9/16/2019.  He underwent a CT of the neck 9/20/19 that showed left cervical adenopathy which was subsequently biopsied 10/4/2019 with results consistent with metastatic prostate adenocarcinoma.  At that time, his PSA was 8,970 on 10/10/2019.  He subsequently underwent a bone scan and CT AP on 11/20/2019 that showed widespread bony metastatic disease and a large pelvic mass directly invading the urinary bladder with bulky retroperitoneal and pelvic sidewall lymphadenopathy.    He started first-line palliative treatment with leuprolide and docetaxel on 1/29/2020 and was treated with 11 total cycles of docetaxel ending on 8/28/2020. A restaging CT CAP and bone scan 9/16/20 showed apparent resolution of the invasive prostate mass and lymphadenopathy, and improved but persistent diffuse osseous metastatic disease. His  PSA decreased from a peak of 10,824 down to a sherrie of 16.47 on 6/9/2021 in response to this treatment.      His PSA then slowly drifted up to 30.97 on 11/12/2021 at which time palliative second line bicalutamide was added to leuprolide.  His PSA unfortunately continued to rise to 52.30 on 2/14/2022 for which she was switched from bicalutamide to palliative third line enzalutamide.  His PSA decreased to a sherrie of 16.83 on 7/15/2022 in response to enzalutamide, then started to rise again to 28.72 on 8/11/2022 for which he was changed from enzalutamide to palliative fourth line cabazitaxel, carboplatin, and prednisone.  He received a total of 9 cycles of cabazitaxel, carboplatin, and prednisone ending on 4/24/2023 during which time his PSA was stable in the 40-50 range, however started to increase to the 70-90 range after completing chemotherapy.  He therefore underwent a PSMA PET-CT scan on 7/6/2023 that showed hypermetabolic activity in the majority the prostate and widespread osseous metastatic disease for which she was referred to [facility] for lutetium-177-PSMA therapy.     Prostate cancer (H)   10/4/2019 Pathology    Left neck lymph node biopsy  [facility] overread:  Final Diagnosis   CASE FROM [place], [place] ([id number], OBTAINED 10/04/2019):  LYMPH NODE, LEFT NECK, CORE BIOPSY:  -METASTATIC PROSTATE ADENOCARCINOMA, see comment.      10/10/2019 Tumor Markers    Date PSA Treatment   10/10/2019 8,970.00 High      1/29/2020 10,824.68 High  Started leuprolide, docetaxel    2/19/2020 3,497.46 High       3/11/2020 1,256.01 High       4/2/2020 727.72 High     4/24/2020 633.57 High     5/15/2020 471.45 High     6/4/2020 332.91 High     6/26/2020 229.82 High     7/17/2020 185.36 High     8/7/2020 149.70 High     8/28/2020 124.63 High  Docetaxel x11 cycles completed   12/4/2020 42.69 High     3/15/2021 21.50 High     4/12/2021 17.52 High     5/14/2021 16.84 High     6/9/2021 16.47 High     8/20/2021 19.90 High    "  9/20/2021 22.72 High     10/20/2021 29.24 High     11/12/2021 30.97 High  Added bicalutamide to leuprolide   12/16/2021 29.96 High     1/18/2022 35.33 High     2/14/2022 52.30 High  Changed bicalutamide to enzalutamide   4/22/2022 19.22 High     5/20/2022 17.88 High     6/17/2022 17.92 High     7/15/2022 16.83 High     8/12/2022 19.11 High     9/12/2022 22.04 High     10/11/2022 25.04 High     11/8/2022 28.72 High  Changed enzalutamide to cabazitaxel, carboplatin   11/29/2022 42.73 High     12/20/2022 53.16 High     1/10/2023 52.72 High     1/31/2023 47.75 High     3/14/2023 47.78 High     4/3/2023 50.89 High     4/24/2023 93.83 High  Cabazitaxel, carboplatin x9 cycles completed   5/15/2023 73.02 High     6/26/2023 81.78 High     8/7/2023 117.08 High          7/6/2023 Imaging    PSMA PET  IMPRESSION:   1. Hypermetabolic activity involving a majority of the prostate concerning for   residual disease.   2. Hypermetabolic activity involving numerous osseous structures as discussed   above consistent with metastatic disease.      9/15/2023 -  Chemotherapy    IP ONC Prostate Cancer (PMSA+) - Lutetium Ksenia-177 vipivotide tetraxetan  Plan Provider: Faby Toro MD  Treatment goal: Palliative  Line of treatment: [No plan line of treatment]       History of Present Illness/Interval History:   Liborio is alone for 10/24/2023 visit and reports that he is feeling good and doing well.  He reports no issues from Pluvicto received on 9/15/203 (1st dose). His energy level has improved since stopping chemotherapy in April 2023. On chart review, he is to receive Lupron shots every 3 months. He doesn't think that he as received Lupron shot lately. He has appointment with Oncologist in [place] on 11/6/23.     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 Has complaints of right thumb \"pins and needles\" he thinks it is from milking cows.    Concerned about \"lump in vein\" on right forearm. That he thinks it " "was larger, but states that he bumped it against something that caused bruising initially, and then reduction in size of the \"lump.\" He has had this \"lump\" for at least 1 year. He denies pain, swelling or erythema.    He is experiencing some dry mouth, states that he is drinking more fluids that seem to help.   Reports some left eye watering that he attributes to sinus congestion an pressure. Takes ASA and Benadryl almost daily which helps with is symptoms. No reports of dry eyes.        He denies recent falls.  He denies any new bone pain or tenderness. No nausea or vomiting. No fever or chills, no night sweats. No urinary frequency, retention or urgency; no hematuria. Denies constipation or diarrhea.    Subjective   Past Medical History:   Diagnosis Date    Prostate cancer (H) 2023     Past Surgical History:   Procedure Laterality Date    IR LYMPH NODE BIOPSY  10/4/2019     Social History     Socioeconomic History    Marital status: Single   Tobacco Use    Smoking status: Never     Passive exposure: Never    Smokeless tobacco: Current     Types: Chew, Snuff   Substance and Sexual Activity    Alcohol use: Yes    Drug use: Never      Family History   Problem Relation Age of Onset    No Known Problems Son        Current Outpatient Medications   Medication Sig    cod liver oil CAPS capsule Take 2 capsules by mouth daily    cyanocobalamin (VITAMIN B-12) 100 MCG tablet Take 100 mcg by mouth daily    diphenhydrAMINE (BENADRYL) 25 MG capsule Take 25 mg by mouth every 6 hours as needed for allergies    magnesium 250 MG tablet Take 1 tablet by mouth daily    pyridOXINE (VITAMIN B6) 100 MG TABS Take 25 mg by mouth daily    vitamin C with B complex (B COMPLEX-C) tablet Take 1 tablet by mouth daily     No current facility-administered medications for this visit.        Objective   ECO-1  Physical Exam:   Blood pressure 130/89, pulse 72, temperature 97.6  F (36.4  C), temperature source Oral, resp. rate 16, weight " 58.6 kg (129 lb 1.6 oz), SpO2 96%.  Physical Exam  Constitutional:       General: He is not in acute distress.     Appearance: He is not ill-appearing.   HENT:      Head: Normocephalic and atraumatic.      Mouth/Throat:      Mouth: Mucous membranes are moist.   Eyes:      General: No scleral icterus.     Extraocular Movements: Extraocular movements intact.   Cardiovascular:      Rate and Rhythm: Normal rate and regular rhythm.   Pulmonary:      Effort: Pulmonary effort is normal. No respiratory distress.   Abdominal:      General: There is no distension.      Palpations: Abdomen is soft.   Musculoskeletal:      Right hand: No swelling or tenderness. Normal range of motion.      Right lower leg: No edema.      Left lower leg: No edema.      Comments: Right hand: Mild thenar atrophy     Skin:     General: Skin is warm and dry.      Coloration: Skin is not jaundiced.      Comments: Right forearm: approximate 2 mm moblie mass cephalic vein; no erythema, edema or warmth noted   Neurological:      Mental Status: He is alert and oriented to person, place, and time.      Sensory: Sensation is intact.      Motor: Motor function is intact.      Gait: Gait is intact.   Psychiatric:         Mood and Affect: Mood normal.     Data:   CMP 10/16/23:  Component  Ref Range & Units 9 d ago Comments   Sodium  136 - 146 mmol/L 140    Potassium  3.5 - 5.1 mmol/L 4.1    Chloride  98 - 107 mmol/L 106    CO2  22 - 29 mmol/L 24    Anion Gap  10.0 - 20.0 mmol/L 14.1    Creatinine  0.72 - 1.25 mg/dL 0.86    Blood Urea Nitrogen  10.0 - 20.0 mg/dL 21.5 High     BUN/Creatinine Ratio  11.70 - 22.90 ratio 25.00 High     Calcium, Total  8.6 - 10.5 mg/dL 9.4    Glucose  70 - 100 mg/dL 76    eGFR  >=60 mL/min/1.73m(2) >60 Calculation based on the Chronic Kidney Disease Epidemiology Collaboration (CKD-EPI) equation refit without adjustment for race.  GFR reference range is not established in patients >70 years old.   Total Protein  6.0 - 8.0 g/dL 6.4     Albumin  3.4 - 4.8 g/dL 4.2    Globulin  2.0 - 3.5 g/dL 2.2    Albumin/Globulin Ratio  1.0 - 2.0 1.9    Aspartate Aminotransferase (AST)  5 - 41 U/L 26    Alanine Aminotransferase (ALT)  8 - 45 U/L 22    Alkaline Phosphatase  50 - 136 U/L 69    Bilirubin, Total  0.2 - 1.2 mg/dL 0.4    eCrCl (Rx) - Adults  mL/min 54.8    Fasting Status N/A        CBC 10/16/23  WBC Count, Corrected  3.9 - 11.9 10(3)/uL 5.1   RBC Count  4.08 - 5.79 10(6)/uL 4.24   Hemoglobin  13.1 - 17.1 g/dL 13.7   Hematocrit  38.7 - 51.4 % 39.3   MCV  82.9 - 100.6 fL 92.7   MCH  27.6 - 33.2 pg 32.2   MCHC  32.0 - 36.0 g/dL 34.7   RDW  10.0 - 16.2 % 14.4   Platelets  179 - 450 10(3)/uL 167 Low    MPV  7.4 - 10.4 fL 6.6 Low    % Neutrophils  43.0 - 80.0 % 79.3   % Lymphocytes  16.0 - 49.0 % 11.7 Low    % Monocytes  0.0 - 10.0 % 6.8   % Eosinophils  0.0 - 7.0 % 1.6   % Basophils  0.0 - 2.0 % 0.6   Abs Neutrophils  1.6 - 8.1 10(3)/uL 4.1   Abs Lymphocytes  0.9 - 3.5 10(3)/uL 0.6 Low    Abs Monocytes  0.0 - 1.1 10(3)/uL 0.4   Abs Eosinophils  0.0 - 0.8 10(3)/uL 0.1   Abs Basophils  0.0 - 0.2 10(3)/uL 0.0     PSA:    Component      Latest Ref Rng 8/24/2023  4:51 PM   PSA Tumor Marker      ng/mL 173.50        10/16/2023  Component  Ref Range & Units 8 d ago   PSA, Total  0.00 - 7.20 ng/mL 153.55 High    Resulting Agency [facility] LABORATORY SERVICES [facility]     ASSESSMENT AND PLAN  Stage IV castration resistant prostate adenocarcinoma:  Liborio Lira is a 83 year old male with 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 He is doing well, with no significant side effects from first cycle of Pluvicto. He is having some dry mouth, but endorses taking Benadryl daily which may also have a drying affect. Discussed re-initiation of Lupron shots as recommend. These will likely be life long. Offered to to follow up with him via video  "visits going forward, however, he prefers to continue with in person visits.   - D1C2 Pluvicto 10/31/2023, labs appropriate to proceed. PSA on 08/24/23 prior to cycle 1 pluvicto, 173.50 --> down trending to 153.55 after 1 dose.   - Appointment with Dr. Dr. Maria Teresa Vang ([facility]) 11/6/2023;recommend he receive lupron shot at that time. Monthly Zoledronic acid with Dr. Vang locally.   - Follow up with Dr. Mcdonald 12/4/2023 with pluvicto on 12/13/23. Labs locally 2 weeks prior to pluvicto dose.     Sinus congestion  Patient reports some left nasal congestion to include drainage of right eye. States that he is taking ASA and Benadryl for relief almost daily. Recommend that he discontinue benadryl and suggested that he trial Claritin. Concerns for dry mouth, as well as anticholinergic effects of benadryl, in addition to fall risk. Patient endorsed treatment plan. Will monitor symptoms of dry mouth after discontinuation of Benadryl vs Pluvicto. Recommend Biotene for dry mouth symptoms and increase fluid intake.   - Stop taking Benadryl  - Start Claritin 10 mg daily, if sinus congestion continues discussed return to PCP for further workup.     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  Physical exam positive for mild thenar atrophy, no decreased sensation to light touch.  Discussed with patient that the etiology of his symptoms  may be related to carpal tunnel syndrome. Recommend that he trial hand split hs and follow up with primary care should his symptoms not improve with splinting. He endorsed treatment plan.    Right Forearm Venous Lump  - unclear etiology, slowly improving.   - Offered to U/S the lesion for reassurance, he declined at this time. Red Flags: increased swelling, pain, erythema or limited range of motion would warrant further evaluation.   - Will continue to monitor    REGGIE Ramirez Student    45 minutes spent on the date of " the encounter doing chart review, review of outside records, review of test results, patient visit, and documentation     Ofe Farr PA-C

## 2023-10-24 NOTE — NURSING NOTE
"Oncology Rooming Note    October 24, 2023 12:26 PM   Liborio Lira is a 83 year old male who presents for:    Chief Complaint   Patient presents with    Oncology Clinic Visit     Prostate Cancer     Initial Vitals: /89 (BP Location: Left arm, Patient Position: Sitting, Cuff Size: Adult Large)   Pulse 72   Temp 97.6  F (36.4  C) (Oral)   Resp 16   Wt 58.6 kg (129 lb 1.6 oz)   SpO2 96%   BMI 21.00 kg/m   Estimated body mass index is 21 kg/m  as calculated from the following:    Height as of 8/24/23: 1.67 m (5' 5.75\").    Weight as of this encounter: 58.6 kg (129 lb 1.6 oz). Body surface area is 1.65 meters squared.  No Pain (0) Comment: Data Unavailable   No LMP for male patient.  Allergies reviewed: Yes  Medications reviewed: Yes    Medications: Medication refills not needed today.  Pharmacy name entered into [facility]: [facility] [place] [phone]    Clinical concerns: None       Jesica Farr LPN  10/24/2023                "

## 2023-10-26 RX ORDER — METHYLPREDNISOLONE SODIUM SUCCINATE 125 MG/2ML
125 INJECTION, POWDER, LYOPHILIZED, FOR SOLUTION INTRAMUSCULAR; INTRAVENOUS
Status: CANCELLED
Start: 2023-10-26

## 2023-10-26 RX ORDER — MEPERIDINE HYDROCHLORIDE 25 MG/ML
25 INJECTION INTRAMUSCULAR; INTRAVENOUS; SUBCUTANEOUS EVERY 30 MIN PRN
Status: CANCELLED | OUTPATIENT
Start: 2023-10-26

## 2023-10-26 RX ORDER — ALBUTEROL SULFATE 90 UG/1
1-2 AEROSOL, METERED RESPIRATORY (INHALATION)
Status: CANCELLED
Start: 2023-10-26

## 2023-10-26 RX ORDER — ONDANSETRON 4 MG/1
8 TABLET, FILM COATED ORAL
Status: CANCELLED | OUTPATIENT
Start: 2023-10-26

## 2023-10-26 RX ORDER — LORAZEPAM 0.5 MG/1
.5-1 TABLET ORAL EVERY 6 HOURS PRN
Status: CANCELLED
Start: 2023-10-26

## 2023-10-26 RX ORDER — DIPHENHYDRAMINE HYDROCHLORIDE 50 MG/ML
50 INJECTION INTRAMUSCULAR; INTRAVENOUS
Status: CANCELLED
Start: 2023-10-26

## 2023-10-26 RX ORDER — EPINEPHRINE 1 MG/ML
0.3 INJECTION, SOLUTION INTRAMUSCULAR; SUBCUTANEOUS EVERY 5 MIN PRN
Status: CANCELLED | OUTPATIENT
Start: 2023-10-26

## 2023-10-26 RX ORDER — ALBUTEROL SULFATE 0.83 MG/ML
2.5 SOLUTION RESPIRATORY (INHALATION)
Status: CANCELLED | OUTPATIENT
Start: 2023-10-26

## 2023-10-26 RX ORDER — LORAZEPAM 2 MG/ML
.5-1 INJECTION INTRAMUSCULAR EVERY 6 HOURS PRN
Status: CANCELLED | OUTPATIENT
Start: 2023-10-26

## 2023-10-26 RX ORDER — PROCHLORPERAZINE MALEATE 5 MG
5-10 TABLET ORAL EVERY 6 HOURS PRN
Status: CANCELLED
Start: 2023-10-26

## 2023-10-31 ENCOUNTER — HOSPITAL ENCOUNTER (OUTPATIENT)
Dept: NUCLEAR MEDICINE | Facility: CLINIC | Age: 83
Setting detail: NUCLEAR MEDICINE
Discharge: HOME | End: 2023-10-31
Attending: INTERNAL MEDICINE
Payer: COMMERCIAL

## 2023-10-31 DIAGNOSIS — C61 PROSTATE CANCER (H): ICD-10-CM

## 2023-10-31 DIAGNOSIS — C61 PROSTATE CANCER (H): Primary | ICD-10-CM

## 2023-10-31 PROCEDURE — 344N000001 HC RX 344: Mod: JZ

## 2023-10-31 PROCEDURE — 79101 NUCLEAR RX IV ADMIN: CPT | Mod: 26 | Performed by: RADIOLOGY

## 2023-10-31 PROCEDURE — 79101 NUCLEAR RX IV ADMIN: CPT

## 2023-10-31 PROCEDURE — A9607 HC RX 344: HCPCS | Mod: JZ

## 2023-10-31 RX ORDER — DIPHENHYDRAMINE HYDROCHLORIDE 50 MG/ML
50 INJECTION INTRAMUSCULAR; INTRAVENOUS
Status: DISCONTINUED | OUTPATIENT
Start: 2023-10-31 | End: 2023-11-01 | Stop reason: HOSPADM

## 2023-10-31 RX ORDER — LORAZEPAM 0.5 MG/1
.5-1 TABLET ORAL EVERY 6 HOURS PRN
Status: DISCONTINUED | OUTPATIENT
Start: 2023-10-31 | End: 2023-11-01 | Stop reason: HOSPADM

## 2023-10-31 RX ORDER — ALBUTEROL SULFATE 90 UG/1
1-2 AEROSOL, METERED RESPIRATORY (INHALATION)
Status: DISCONTINUED | OUTPATIENT
Start: 2023-10-31 | End: 2023-11-01 | Stop reason: HOSPADM

## 2023-10-31 RX ORDER — ONDANSETRON 8 MG/1
8 TABLET, FILM COATED ORAL
Status: DISCONTINUED | OUTPATIENT
Start: 2023-10-31 | End: 2023-11-01 | Stop reason: HOSPADM

## 2023-10-31 RX ORDER — MEPERIDINE HYDROCHLORIDE 25 MG/ML
25 INJECTION INTRAMUSCULAR; INTRAVENOUS; SUBCUTANEOUS EVERY 30 MIN PRN
Status: DISCONTINUED | OUTPATIENT
Start: 2023-10-31 | End: 2023-11-01 | Stop reason: HOSPADM

## 2023-10-31 RX ORDER — PROCHLORPERAZINE MALEATE 5 MG
5-10 TABLET ORAL EVERY 6 HOURS PRN
Status: DISCONTINUED | OUTPATIENT
Start: 2023-10-31 | End: 2023-11-01 | Stop reason: HOSPADM

## 2023-10-31 RX ORDER — EPINEPHRINE 1 MG/ML
0.3 INJECTION, SOLUTION, CONCENTRATE INTRAVENOUS EVERY 5 MIN PRN
Status: DISCONTINUED | OUTPATIENT
Start: 2023-10-31 | End: 2023-11-01 | Stop reason: HOSPADM

## 2023-10-31 RX ORDER — ALBUTEROL SULFATE 0.83 MG/ML
2.5 SOLUTION RESPIRATORY (INHALATION)
Status: DISCONTINUED | OUTPATIENT
Start: 2023-10-31 | End: 2023-11-01 | Stop reason: HOSPADM

## 2023-10-31 RX ORDER — LORAZEPAM 2 MG/ML
.5-1 INJECTION INTRAMUSCULAR EVERY 6 HOURS PRN
Status: DISCONTINUED | OUTPATIENT
Start: 2023-10-31 | End: 2023-11-01 | Stop reason: HOSPADM

## 2023-10-31 RX ORDER — METHYLPREDNISOLONE SODIUM SUCCINATE 125 MG/2ML
125 INJECTION, POWDER, LYOPHILIZED, FOR SOLUTION INTRAMUSCULAR; INTRAVENOUS
Status: DISCONTINUED | OUTPATIENT
Start: 2023-10-31 | End: 2023-11-01 | Stop reason: HOSPADM

## 2023-10-31 RX ADMIN — LUTETIUM LU 177 VIPIVOTIDE TETRAXETAN 200 MILLICURIE: 27 INJECTION, SOLUTION INTRAVENOUS at 13:29

## 2023-10-31 NOTE — PROGRESS NOTES
[facility] Nursing Note:    Patient presents today for Pluvicto therapy, dose 2 of  6  Patient seen by provider today: Yes: Dr Martins   present during visit today: NOT APPLICABLE      Intravenous Access:  Peripheral IV placed     Treatment Conditions:  Labs done on  10/16/23    Results reviewed, labs Met treatment parameters, ok to proceed with treatment.           Post Infusion Assessment:  Patient tolerated infusion without incident.    PIV - No evidence of extravasations, access discontinued per protocol.         Discharge Plan:   Patient will return as scheduled for next appointment.   Patient to imaging at 1415  in stable condition accompanied by: self  Departure Mode: Ambulatory

## 2023-12-03 NOTE — PROGRESS NOTES
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 Maria Teresa Vang ([facility])  Current Treatment and intent: referred for palliative 5th line Ksenia-177-PSMA    Treatment Summary:   1/29/20 to present: leuprolide q3m  1/29/20 to present: zoledronic acid monthly  1/29/20 to 8/28/20: docetaxel x11 cycles (PSA 10,824 ? 16.47)  11/16/21 to 2/14/22: bicalutamide  2/17/22 to 11/8/22: enzalutamide  11/8/22 to 4/24/23: cabazitaxel, carboplatin, prednisone x9 cycles  Pluvicto times 2 cycles, initiated 9/15/2023. PSA just prior (8/24/23) was 173.5. PSA was 153.55 (10/16/2023) prior to cycle 2. He is back today for consideration of cycle 3.         Interval History:   Jeannette is back without son Morgan.  He can't believe how much better he feels!  Has gained weight because he has more appetite.  Has more energy. 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 Chopped wood with a neighbor the other night. Enjoys his snuff.  Has no real side effects from the Pluvitco. Aghain, he compares everything to how crummy he felt while getting chemotherapy.  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   He is experiencing some dry mouth, states that he is drinking more fluids that seem to help.     Takes ASA and Benadryl almost daily which helps with is symptoms. No reports of dry eyes.    Has absolutely no pain.  He denies recent falls.    No nausea or vomiting. No fever or chills, no night sweats.   No urinary frequency, retention or urgency; no hematuria.   Denies constipation or diarrhea.    Hem/onc History:   Edit Oncology History  Oncology History Overview Note   Jeannette initially presented to his PCP with a lump on the left side of his neck on 9/16/2019.  He underwent a CT of the neck 9/20/19 that showed left  cervical adenopathy which was subsequently biopsied 10/4/2019 with results consistent with metastatic prostate adenocarcinoma.  At that time, his PSA was 8,970 on 10/10/2019.  He subsequently underwent a bone scan and CT AP on 11/20/2019 that showed widespread bony metastatic disease and a large pelvic mass directly invading the urinary bladder with bulky retroperitoneal and pelvic sidewall lymphadenopathy.    He started first-line palliative treatment with leuprolide and docetaxel on 1/29/2020 and was treated with 11 total cycles of docetaxel ending on 8/28/2020. A restaging CT CAP and bone scan 9/16/20 showed apparent resolution of the invasive prostate mass and lymphadenopathy, and improved but persistent diffuse osseous metastatic disease. His PSA decreased from a peak of 10,824 down to a sherrie of 16.47 on 6/9/2021 in response to this treatment.      His PSA then slowly drifted up to 30.97 on 11/12/2021 at which time palliative second line bicalutamide was added to leuprolide.  His PSA unfortunately continued to rise to 52.30 on 2/14/2022 for which she was switched from bicalutamide to palliative third line enzalutamide.  His PSA decreased to a sherrie of 16.83 on 7/15/2022 in response to enzalutamide, then started to rise again to 28.72 on 8/11/2022 for which he was changed from enzalutamide to palliative fourth line cabazitaxel, carboplatin, and prednisone.  He received a total of 9 cycles of cabazitaxel, carboplatin, and prednisone ending on 4/24/2023 during which time his PSA was stable in the 40-50 range, however started to increase to the 70-90 range after completing chemotherapy.  He therefore underwent a PSMA PET-CT scan on 7/6/2023 that showed hypermetabolic activity in the majority the prostate and widespread osseous metastatic disease for which she was referred to [facility] for lutetium-177-PSMA therapy.     Prostate cancer (H)   10/4/2019 Pathology    Left neck lymph node biopsy  UMN overread:  Final  Diagnosis   CASE FROM [place], [place] ([id number], OBTAINED 10/04/2019):  LYMPH NODE, LEFT NECK, CORE BIOPSY:  -METASTATIC PROSTATE ADENOCARCINOMA, see comment.      10/10/2019 Tumor Markers    Date PSA Treatment   10/10/2019 8,970.00 High      1/29/2020 10,824.68 High  Started leuprolide, docetaxel    2/19/2020 3,497.46 High       3/11/2020 1,256.01 High       4/2/2020 727.72 High     4/24/2020 633.57 High     5/15/2020 471.45 High     6/4/2020 332.91 High     6/26/2020 229.82 High     7/17/2020 185.36 High     8/7/2020 149.70 High     8/28/2020 124.63 High  Docetaxel x11 cycles completed   12/4/2020 42.69 High     3/15/2021 21.50 High     4/12/2021 17.52 High     5/14/2021 16.84 High     6/9/2021 16.47 High     8/20/2021 19.90 High     9/20/2021 22.72 High     10/20/2021 29.24 High     11/12/2021 30.97 High  Added bicalutamide to leuprolide   12/16/2021 29.96 High     1/18/2022 35.33 High     2/14/2022 52.30 High  Changed bicalutamide to enzalutamide   4/22/2022 19.22 High     5/20/2022 17.88 High     6/17/2022 17.92 High     7/15/2022 16.83 High     8/12/2022 19.11 High     9/12/2022 22.04 High     10/11/2022 25.04 High     11/8/2022 28.72 High  Changed enzalutamide to cabazitaxel, carboplatin   11/29/2022 42.73 High     12/20/2022 53.16 High     1/10/2023 52.72 High     1/31/2023 47.75 High     3/14/2023 47.78 High     4/3/2023 50.89 High     4/24/2023 93.83 High  Cabazitaxel, carboplatin x9 cycles completed   5/15/2023 73.02 High     6/26/2023 81.78 High     8/7/2023 117.08 High          7/6/2023 Imaging    PSMA PET  IMPRESSION:   1. Hypermetabolic activity involving a majority of the prostate concerning for   residual disease.   2. Hypermetabolic activity involving numerous osseous structures as discussed   above consistent with metastatic disease.      9/15/2023 -  Chemotherapy    IP ONC Prostate Cancer (PMSA+) - Lutetium Ksenia-177 vipivotide tetraxetan  Plan Provider: Faby Toro MD  Treatment  goal: Palliative  Line of treatment: [No plan line of treatment]     Subjective   Past Medical History:   Diagnosis Date    Prostate cancer (H) 2023     Past Surgical History:   Procedure Laterality Date    IR LYMPH NODE BIOPSY  10/4/2019     Social History     Socioeconomic History    Marital status: Single   Tobacco Use    Smoking status: Never     Passive exposure: Never    Smokeless tobacco: Current     Types: Chew, Snuff   Substance and Sexual Activity    Alcohol use: Yes    Drug use: Never      Family History   Problem Relation Age of Onset    No Known Problems Son        Current Outpatient Medications   Medication Sig    cod liver oil CAPS capsule Take 2 capsules by mouth daily    cyanocobalamin (VITAMIN B-12) 100 MCG tablet Take 100 mcg by mouth daily    diphenhydrAMINE (BENADRYL) 25 MG capsule Take 25 mg by mouth every 6 hours as needed for allergies    magnesium 250 MG tablet Take 1 tablet by mouth daily    pyridOXINE (VITAMIN B6) 100 MG TABS Take 25 mg by mouth daily    vitamin C with B complex (B COMPLEX-C) tablet Take 1 tablet by mouth daily     No current facility-administered medications for this visit.        Objective   ECO-1  Physical Exam:   There were no vitals taken for this visit.  General: He is a pleasant older man who looks younger than his age.  He is in no distress.  He easily gets onto the exam table without assistance.  Head: Full head of thin hair  Sclera: Anicteric  Conjunctiva: Not pale  Oropharynx: Poor dentition  Neck: Supple, free range of motion, no JVD  Respirations: Unforced respiratory effort, clear to auscultation bilaterally  Cardiovascular: S1, S2, regular rate and rhythm  GI: Positive bowel sounds, nontender, nondistended, no hepatosplenomegaly  Extremities: No edema  Skin: Obvious sun damage on the back of the neck  Neuro: Cranial nerves II through XII grossly intact, bilateral upper and lower extremity strength is symmetrical  Psych: Normal affect  Data:   CMP  10/16/23:  Component  Ref Range & Units 9 d ago Comments   Sodium  136 - 146 mmol/L 140    Potassium  3.5 - 5.1 mmol/L 4.1    Chloride  98 - 107 mmol/L 106    CO2  22 - 29 mmol/L 24    Anion Gap  10.0 - 20.0 mmol/L 14.1    Creatinine  0.72 - 1.25 mg/dL 0.86    Blood Urea Nitrogen  10.0 - 20.0 mg/dL 21.5 High     BUN/Creatinine Ratio  11.70 - 22.90 ratio 25.00 High     Calcium, Total  8.6 - 10.5 mg/dL 9.4    Glucose  70 - 100 mg/dL 76    eGFR  >=60 mL/min/1.73m(2) >60 Calculation based on the Chronic Kidney Disease Epidemiology Collaboration (CKD-EPI) equation refit without adjustment for race.  GFR reference range is not established in patients >70 years old.   Total Protein  6.0 - 8.0 g/dL 6.4    Albumin  3.4 - 4.8 g/dL 4.2    Globulin  2.0 - 3.5 g/dL 2.2    Albumin/Globulin Ratio  1.0 - 2.0 1.9    Aspartate Aminotransferase (AST)  5 - 41 U/L 26    Alanine Aminotransferase (ALT)  8 - 45 U/L 22    Alkaline Phosphatase  50 - 136 U/L 69    Bilirubin, Total  0.2 - 1.2 mg/dL 0.4    eCrCl (Rx) - Adults  mL/min 54.8    Fasting Status N/A        CBC 10/16/23  WBC Count, Corrected  3.9 - 11.9 10(3)/uL 5.1   RBC Count  4.08 - 5.79 10(6)/uL 4.24   Hemoglobin  13.1 - 17.1 g/dL 13.7   Hematocrit  38.7 - 51.4 % 39.3   MCV  82.9 - 100.6 fL 92.7   MCH  27.6 - 33.2 pg 32.2   MCHC  32.0 - 36.0 g/dL 34.7   RDW  10.0 - 16.2 % 14.4   Platelets  179 - 450 10(3)/uL 167 Low    MPV  7.4 - 10.4 fL 6.6 Low    % Neutrophils  43.0 - 80.0 % 79.3   % Lymphocytes  16.0 - 49.0 % 11.7 Low    % Monocytes  0.0 - 10.0 % 6.8   % Eosinophils  0.0 - 7.0 % 1.6   % Basophils  0.0 - 2.0 % 0.6   Abs Neutrophils  1.6 - 8.1 10(3)/uL 4.1   Abs Lymphocytes  0.9 - 3.5 10(3)/uL 0.6 Low    Abs Monocytes  0.0 - 1.1 10(3)/uL 0.4   Abs Eosinophils  0.0 - 0.8 10(3)/uL 0.1   Abs Basophils  0.0 - 0.2 10(3)/uL 0.0     PSA:    Component      Latest Ref Rng 8/24/2023  4:51 PM   PSA Tumor Marker      ng/mL 173.50        10/16/2023  Component  Ref Range & Units 8 d ago   PSA,  Total  0.00 - 7.20 ng/mL 153.55 [facility] LABORATORY SERVICES [facility]     ASSESSMENT AND PLAN  Stage IV castration resistant prostate adenocarcinoma: Jeannette is status post 2 cycles of Pluvicto with really a remarkable response.  His PSA did drop in his quality of life has improved immeasurably.  He is eating, his get about 12 pounds since we got started, and has excellent energy.  Of course, part of this is just stopping the chemotherapy and recovering from that, but I really do feel he is having a response as well.  The plan at this point is to continue with a total of 6 cycles of the Pluvicto.  D1C3 Pluvicto 12/13/23 labs appropriate to proceed.   S/P recent Eligard 22.5 mg, 11/24/2023 with Dr. Vang.  Monthly Zoledronic acid with Dr. Vang.   RTC in 6 weeks for cycle 4 of a planned 6.       40 minutes spent on the date of the encounter doing chart review, review of outside records, review of test results, patient visit, and documentation     Nav Mcdonald MD, MSc  Associate Professor of Medicine  [employer] [facility] [employer] [facility] [employer]  [facility]  [address] [place] [address] [phone]

## 2023-12-04 ENCOUNTER — ONCOLOGY VISIT (OUTPATIENT)
Dept: ONCOLOGY | Facility: CLINIC | Age: 83
End: 2023-12-04
Attending: INTERNAL MEDICINE
Payer: COMMERCIAL

## 2023-12-04 VITALS
RESPIRATION RATE: 16 BRPM | DIASTOLIC BLOOD PRESSURE: 71 MMHG | TEMPERATURE: 97.9 F | OXYGEN SATURATION: 98 % | HEART RATE: 63 BPM | BODY MASS INDEX: 22.4 KG/M2 | WEIGHT: 137.7 LBS | SYSTOLIC BLOOD PRESSURE: 126 MMHG

## 2023-12-04 DIAGNOSIS — C61 PROSTATE CANCER (H): Primary | ICD-10-CM

## 2023-12-04 PROCEDURE — G0463 HOSPITAL OUTPT CLINIC VISIT: HCPCS | Performed by: INTERNAL MEDICINE

## 2023-12-04 PROCEDURE — 99215 OFFICE O/P EST HI 40 MIN: CPT | Performed by: INTERNAL MEDICINE

## 2023-12-04 RX ORDER — LORATADINE 10 MG/1
10 TABLET, ORALLY DISINTEGRATING ORAL DAILY
COMMUNITY

## 2023-12-04 ASSESSMENT — PAIN SCALES - GENERAL: PAINLEVEL: NO PAIN (0)

## 2023-12-04 NOTE — LETTER
12/4/2023         RE: Liborio Lira  [address] [facility] [address]        Dear Colleague,    Thank you for referring your patient, Liborio Lira, to the [facility]. Please see a copy of my visit note below.    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 Maria Teresa Vang ([facility])  Current Treatment and intent: referred for palliative 5th line Ksenia-177-PSMA    Treatment Summary:   1/29/20 to present: leuprolide q3m  1/29/20 to present: zoledronic acid monthly  1/29/20 to 8/28/20: docetaxel x11 cycles (PSA 10,824 ? 16.47)  11/16/21 to 2/14/22: bicalutamide  2/17/22 to 11/8/22: enzalutamide  11/8/22 to 4/24/23: cabazitaxel, carboplatin, prednisone x9 cycles  Pluvicto times 2 cycles, initiated 9/15/2023. PSA just prior (8/24/23) was 173.5. PSA was 153.55 (10/16/2023) prior to cycle 2. He is back today for consideration of cycle 3.         Interval History:   Jeannette is back without son Morgan.  He can't believe how much better he feels!  Has gained weight because he has more appetite.  Has more energy. 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 Chopped wood with a neighbor the other night. Enjoys his snuff.  Has no real side effects from the Pluvitco. Aghain, he compares everything to how crummy he felt while getting chemotherapy.  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   He is experiencing some dry mouth, states that he is drinking more fluids that seem to help.     Takes ASA and Benadryl almost daily which helps with is symptoms. No reports of dry eyes.    Has absolutely no pain.  He denies recent falls.    No nausea or vomiting. No fever or chills, no night sweats.   No urinary frequency, retention or urgency; no hematuria.    Denies constipation or diarrhea.    Hem/onc History:   Edit Oncology History  Oncology History Overview Note   Jeannette initially presented to his PCP with a lump on the left side of his neck on 9/16/2019.  He underwent a CT of the neck 9/20/19 that showed left cervical adenopathy which was subsequently biopsied 10/4/2019 with results consistent with metastatic prostate adenocarcinoma.  At that time, his PSA was 8,970 on 10/10/2019.  He subsequently underwent a bone scan and CT AP on 11/20/2019 that showed widespread bony metastatic disease and a large pelvic mass directly invading the urinary bladder with bulky retroperitoneal and pelvic sidewall lymphadenopathy.    He started first-line palliative treatment with leuprolide and docetaxel on 1/29/2020 and was treated with 11 total cycles of docetaxel ending on 8/28/2020. A restaging CT CAP and bone scan 9/16/20 showed apparent resolution of the invasive prostate mass and lymphadenopathy, and improved but persistent diffuse osseous metastatic disease. His PSA decreased from a peak of 10,824 down to a sherrie of 16.47 on 6/9/2021 in response to this treatment.      His PSA then slowly drifted up to 30.97 on 11/12/2021 at which time palliative second line bicalutamide was added to leuprolide.  His PSA unfortunately continued to rise to 52.30 on 2/14/2022 for which she was switched from bicalutamide to palliative third line enzalutamide.  His PSA decreased to a sherrie of 16.83 on 7/15/2022 in response to enzalutamide, then started to rise again to 28.72 on 8/11/2022 for which he was changed from enzalutamide to palliative fourth line cabazitaxel, carboplatin, and prednisone.  He received a total of 9 cycles of cabazitaxel, carboplatin, and prednisone ending on 4/24/2023 during which time his PSA was stable in the 40-50 range, however started to increase to the 70-90 range after completing chemotherapy.  He therefore underwent a PSMA PET-CT scan on 7/6/2023 that showed  hypermetabolic activity in the majority the prostate and widespread osseous metastatic disease for which she was referred to [facility] for lutetium-177-PSMA therapy.     Prostate cancer (H)   10/4/2019 Pathology    Left neck lymph node biopsy  [facility] overread:  Final Diagnosis   CASE FROM [place], [place] ([id number], OBTAINED 10/04/2019):  LYMPH NODE, LEFT NECK, CORE BIOPSY:  -METASTATIC PROSTATE ADENOCARCINOMA, see comment.      10/10/2019 Tumor Markers    Date PSA Treatment   10/10/2019 8,970.00 High      1/29/2020 10,824.68 High  Started leuprolide, docetaxel    2/19/2020 3,497.46 High       3/11/2020 1,256.01 High       4/2/2020 727.72 High     4/24/2020 633.57 High     5/15/2020 471.45 High     6/4/2020 332.91 High     6/26/2020 229.82 High     7/17/2020 185.36 High     8/7/2020 149.70 High     8/28/2020 124.63 High  Docetaxel x11 cycles completed   12/4/2020 42.69 High     3/15/2021 21.50 High     4/12/2021 17.52 High     5/14/2021 16.84 High     6/9/2021 16.47 High     8/20/2021 19.90 High     9/20/2021 22.72 High     10/20/2021 29.24 High     11/12/2021 30.97 High  Added bicalutamide to leuprolide   12/16/2021 29.96 High     1/18/2022 35.33 High     2/14/2022 52.30 High  Changed bicalutamide to enzalutamide   4/22/2022 19.22 High     5/20/2022 17.88 High     6/17/2022 17.92 High     7/15/2022 16.83 High     8/12/2022 19.11 High     9/12/2022 22.04 High     10/11/2022 25.04 High     11/8/2022 28.72 High  Changed enzalutamide to cabazitaxel, carboplatin   11/29/2022 42.73 High     12/20/2022 53.16 High     1/10/2023 52.72 High     1/31/2023 47.75 High     3/14/2023 47.78 High     4/3/2023 50.89 High     4/24/2023 93.83 High  Cabazitaxel, carboplatin x9 cycles completed   5/15/2023 73.02 High     6/26/2023 81.78 High     8/7/2023 117.08 High          7/6/2023 Imaging    PSMA PET  IMPRESSION:   1. Hypermetabolic activity involving a majority of the prostate concerning for   residual disease.   2. Hypermetabolic  activity involving numerous osseous structures as discussed   above consistent with metastatic disease.      9/15/2023 -  Chemotherapy    IP ONC Prostate Cancer (PMSA+) - Lutetium Ksenia-177 vipivotide tetraxetan  Plan Provider: Faby Toro MD  Treatment goal: Palliative  Line of treatment: [No plan line of treatment]     Subjective  Past Medical History:   Diagnosis Date    Prostate cancer (H) 2023     Past Surgical History:   Procedure Laterality Date    IR LYMPH NODE BIOPSY  10/4/2019     Social History     Socioeconomic History    Marital status: Single   Tobacco Use    Smoking status: Never     Passive exposure: Never    Smokeless tobacco: Current     Types: Chew, Snuff   Substance and Sexual Activity    Alcohol use: Yes    Drug use: Never      Family History   Problem Relation Age of Onset    No Known Problems Son        Current Outpatient Medications   Medication Sig    cod liver oil CAPS capsule Take 2 capsules by mouth daily    cyanocobalamin (VITAMIN B-12) 100 MCG tablet Take 100 mcg by mouth daily    diphenhydrAMINE (BENADRYL) 25 MG capsule Take 25 mg by mouth every 6 hours as needed for allergies    magnesium 250 MG tablet Take 1 tablet by mouth daily    pyridOXINE (VITAMIN B6) 100 MG TABS Take 25 mg by mouth daily    vitamin C with B complex (B COMPLEX-C) tablet Take 1 tablet by mouth daily     No current facility-administered medications for this visit.        Objective  ECO-1  Physical Exam:   There were no vitals taken for this visit.  General: He is a pleasant older man who looks younger than his age.  He is in no distress.  He easily gets onto the exam table without assistance.  Head: Full head of thin hair  Sclera: Anicteric  Conjunctiva: Not pale  Oropharynx: Poor dentition  Neck: Supple, free range of motion, no JVD  Respirations: Unforced respiratory effort, clear to auscultation bilaterally  Cardiovascular: S1, S2, regular rate and rhythm  GI: Positive bowel sounds, nontender,  nondistended, no hepatosplenomegaly  Extremities: No edema  Skin: Obvious sun damage on the back of the neck  Neuro: Cranial nerves II through XII grossly intact, bilateral upper and lower extremity strength is symmetrical  Psych: Normal affect  Data:   CMP 10/16/23:  Component  Ref Range & Units 9 d ago Comments   Sodium  136 - 146 mmol/L 140    Potassium  3.5 - 5.1 mmol/L 4.1    Chloride  98 - 107 mmol/L 106    CO2  22 - 29 mmol/L 24    Anion Gap  10.0 - 20.0 mmol/L 14.1    Creatinine  0.72 - 1.25 mg/dL 0.86    Blood Urea Nitrogen  10.0 - 20.0 mg/dL 21.5 High     BUN/Creatinine Ratio  11.70 - 22.90 ratio 25.00 High     Calcium, Total  8.6 - 10.5 mg/dL 9.4    Glucose  70 - 100 mg/dL 76    eGFR  >=60 mL/min/1.73m(2) >60 Calculation based on the Chronic Kidney Disease Epidemiology Collaboration (CKD-EPI) equation refit without adjustment for race.  GFR reference range is not established in patients >70 years old.   Total Protein  6.0 - 8.0 g/dL 6.4    Albumin  3.4 - 4.8 g/dL 4.2    Globulin  2.0 - 3.5 g/dL 2.2    Albumin/Globulin Ratio  1.0 - 2.0 1.9    Aspartate Aminotransferase (AST)  5 - 41 U/L 26    Alanine Aminotransferase (ALT)  8 - 45 U/L 22    Alkaline Phosphatase  50 - 136 U/L 69    Bilirubin, Total  0.2 - 1.2 mg/dL 0.4    eCrCl (Rx) - Adults  mL/min 54.8    Fasting Status N/A        CBC 10/16/23  WBC Count, Corrected  3.9 - 11.9 10(3)/uL 5.1   RBC Count  4.08 - 5.79 10(6)/uL 4.24   Hemoglobin  13.1 - 17.1 g/dL 13.7   Hematocrit  38.7 - 51.4 % 39.3   MCV  82.9 - 100.6 fL 92.7   MCH  27.6 - 33.2 pg 32.2   MCHC  32.0 - 36.0 g/dL 34.7   RDW  10.0 - 16.2 % 14.4   Platelets  179 - 450 10(3)/uL 167 Low    MPV  7.4 - 10.4 fL 6.6 Low    % Neutrophils  43.0 - 80.0 % 79.3   % Lymphocytes  16.0 - 49.0 % 11.7 Low    % Monocytes  0.0 - 10.0 % 6.8   % Eosinophils  0.0 - 7.0 % 1.6   % Basophils  0.0 - 2.0 % 0.6   Abs Neutrophils  1.6 - 8.1 10(3)/uL 4.1   Abs Lymphocytes  0.9 - 3.5 10(3)/uL 0.6 Low    Abs Monocytes  0.0 -  1.1 10(3)/uL 0.4   Abs Eosinophils  0.0 - 0.8 10(3)/uL 0.1   Abs Basophils  0.0 - 0.2 10(3)/uL 0.0     PSA:    Component      Latest Ref Rng 8/24/2023  4:51 PM   PSA Tumor Marker      ng/mL 173.50        10/16/2023  Component  Ref Range & Units 8 d ago   PSA, Total  0.00 - 7.20 ng/mL 153.55 High    Resulting [facility] LABORATORY [facility]     ASSESSMENT AND PLAN  Stage IV castration resistant prostate adenocarcinoma: Jeannette is status post 2 cycles of Pluvicto with really a remarkable response.  His PSA did drop in his quality of life has improved immeasurably.  He is eating, his get about 12 pounds since we got started, and has excellent energy.  Of course, part of this is just stopping the chemotherapy and recovering from that, but I really do feel he is having a response as well.  The plan at this point is to continue with a total of 6 cycles of the Pluvicto.  D1C3 Pluvicto 12/13/23 labs appropriate to proceed.   S/P recent Eligard 22.5 mg, 11/24/2023 with Dr. Vang.  Monthly Zoledronic acid with Dr. Vang.   RTC in 6 weeks for cycle 4 of a planned 6.       40 minutes spent on the date of the encounter doing chart review, review of outside records, review of test results, patient visit, and documentation     Nav Mcdonald MD, MSc  Associate Professor of Medicine  [facility] [employer]  [facility] [address] [id number] [phone]

## 2023-12-04 NOTE — NURSING NOTE
"Oncology Rooming Note    December 4, 2023 11:50 AM   Liborio Lira is a 83 year old male who presents for:    Chief Complaint   Patient presents with    Oncology Clinic Visit     Prostate cancer metastatic to multiple sites     Initial Vitals: /71 (BP Location: Right arm, Patient Position: Sitting, Cuff Size: Adult Regular)   Pulse 63   Temp 97.9  F (36.6  C) (Oral)   Resp 16   Wt 62.5 kg (137 lb 11.2 oz)   SpO2 98%   BMI 22.40 kg/m   Estimated body mass index is 22.4 kg/m  as calculated from the following:    Height as of 8/24/23: 1.67 m (5' 5.75\").    Weight as of this encounter: 62.5 kg (137 lb 11.2 oz). Body surface area is 1.7 meters squared.  No Pain (0) Comment: Data Unavailable   No LMP for male patient.  Allergies reviewed: Yes  Medications reviewed: Yes    Medications: Medication refills not needed today.  Pharmacy name entered into nfon: [id number] - [place] [id number]    Clinical concerns: None      Leida Gee              "

## 2023-12-07 RX ORDER — ALBUTEROL SULFATE 0.83 MG/ML
2.5 SOLUTION RESPIRATORY (INHALATION)
Status: CANCELLED | OUTPATIENT
Start: 2023-12-13

## 2023-12-07 RX ORDER — PROCHLORPERAZINE MALEATE 5 MG
5-10 TABLET ORAL EVERY 6 HOURS PRN
Status: CANCELLED
Start: 2023-12-13

## 2023-12-07 RX ORDER — LORAZEPAM 2 MG/ML
.5-1 INJECTION INTRAMUSCULAR EVERY 6 HOURS PRN
Status: CANCELLED | OUTPATIENT
Start: 2023-12-13

## 2023-12-07 RX ORDER — METHYLPREDNISOLONE SODIUM SUCCINATE 125 MG/2ML
125 INJECTION, POWDER, LYOPHILIZED, FOR SOLUTION INTRAMUSCULAR; INTRAVENOUS
Status: CANCELLED
Start: 2023-12-13

## 2023-12-07 RX ORDER — LORAZEPAM 0.5 MG/1
.5-1 TABLET ORAL EVERY 6 HOURS PRN
Status: CANCELLED
Start: 2023-12-13

## 2023-12-07 RX ORDER — MEPERIDINE HYDROCHLORIDE 25 MG/ML
25 INJECTION INTRAMUSCULAR; INTRAVENOUS; SUBCUTANEOUS EVERY 30 MIN PRN
Status: CANCELLED | OUTPATIENT
Start: 2023-12-13

## 2023-12-07 RX ORDER — ALBUTEROL SULFATE 90 UG/1
1-2 AEROSOL, METERED RESPIRATORY (INHALATION)
Status: CANCELLED
Start: 2023-12-13

## 2023-12-07 RX ORDER — ONDANSETRON 4 MG/1
8 TABLET, FILM COATED ORAL
Status: CANCELLED | OUTPATIENT
Start: 2023-12-13

## 2023-12-07 RX ORDER — DIPHENHYDRAMINE HYDROCHLORIDE 50 MG/ML
50 INJECTION INTRAMUSCULAR; INTRAVENOUS
Status: CANCELLED
Start: 2023-12-13

## 2023-12-07 RX ORDER — EPINEPHRINE 1 MG/ML
0.3 INJECTION, SOLUTION INTRAMUSCULAR; SUBCUTANEOUS EVERY 5 MIN PRN
Status: CANCELLED | OUTPATIENT
Start: 2023-12-13

## 2023-12-13 ENCOUNTER — HOSPITAL ENCOUNTER (OUTPATIENT)
Dept: NUCLEAR MEDICINE | Facility: CLINIC | Age: 83
Setting detail: NUCLEAR MEDICINE
Discharge: HOME | End: 2023-12-13
Attending: INTERNAL MEDICINE | Admitting: INTERNAL MEDICINE
Payer: COMMERCIAL

## 2023-12-13 VITALS
OXYGEN SATURATION: 99 % | HEART RATE: 78 BPM | TEMPERATURE: 97 F | RESPIRATION RATE: 16 BRPM | DIASTOLIC BLOOD PRESSURE: 76 MMHG | SYSTOLIC BLOOD PRESSURE: 136 MMHG

## 2023-12-13 DIAGNOSIS — C61 PROSTATE CANCER (H): Primary | ICD-10-CM

## 2023-12-13 PROCEDURE — 79101 NUCLEAR RX IV ADMIN: CPT | Mod: 26 | Performed by: RADIOLOGY

## 2023-12-13 PROCEDURE — 999N000285 HC STATISTIC VASC ACCESS LAB DRAW WITH PIV START

## 2023-12-13 PROCEDURE — 79101 NUCLEAR RX IV ADMIN: CPT

## 2023-12-13 PROCEDURE — A9607 HC RX 344: HCPCS | Performed by: INTERNAL MEDICINE

## 2023-12-13 PROCEDURE — 344N000001 HC RX 344: Performed by: INTERNAL MEDICINE

## 2023-12-13 RX ORDER — ALBUTEROL SULFATE 0.83 MG/ML
2.5 SOLUTION RESPIRATORY (INHALATION)
Status: DISCONTINUED | OUTPATIENT
Start: 2023-12-13 | End: 2023-12-14 | Stop reason: HOSPADM

## 2023-12-13 RX ORDER — NALOXONE HYDROCHLORIDE 0.4 MG/ML
0.4 INJECTION, SOLUTION INTRAMUSCULAR; INTRAVENOUS; SUBCUTANEOUS
Status: DISCONTINUED | OUTPATIENT
Start: 2023-12-13 | End: 2023-12-14 | Stop reason: HOSPADM

## 2023-12-13 RX ORDER — LORAZEPAM 0.5 MG/1
.5-1 TABLET ORAL EVERY 6 HOURS PRN
Status: DISCONTINUED | OUTPATIENT
Start: 2023-12-13 | End: 2023-12-14 | Stop reason: HOSPADM

## 2023-12-13 RX ORDER — NALOXONE HYDROCHLORIDE 0.4 MG/ML
0.2 INJECTION, SOLUTION INTRAMUSCULAR; INTRAVENOUS; SUBCUTANEOUS
Status: DISCONTINUED | OUTPATIENT
Start: 2023-12-13 | End: 2023-12-14 | Stop reason: HOSPADM

## 2023-12-13 RX ORDER — LORAZEPAM 2 MG/ML
.5-1 INJECTION INTRAMUSCULAR EVERY 6 HOURS PRN
Status: DISCONTINUED | OUTPATIENT
Start: 2023-12-13 | End: 2023-12-14 | Stop reason: HOSPADM

## 2023-12-13 RX ORDER — METHYLPREDNISOLONE SODIUM SUCCINATE 125 MG/2ML
125 INJECTION, POWDER, LYOPHILIZED, FOR SOLUTION INTRAMUSCULAR; INTRAVENOUS
Status: DISCONTINUED | OUTPATIENT
Start: 2023-12-13 | End: 2023-12-14 | Stop reason: HOSPADM

## 2023-12-13 RX ORDER — DIPHENHYDRAMINE HYDROCHLORIDE 50 MG/ML
50 INJECTION INTRAMUSCULAR; INTRAVENOUS
Status: DISCONTINUED | OUTPATIENT
Start: 2023-12-13 | End: 2023-12-14 | Stop reason: HOSPADM

## 2023-12-13 RX ORDER — ONDANSETRON 8 MG/1
8 TABLET, FILM COATED ORAL
Status: DISCONTINUED | OUTPATIENT
Start: 2023-12-13 | End: 2023-12-14 | Stop reason: HOSPADM

## 2023-12-13 RX ORDER — MEPERIDINE HYDROCHLORIDE 25 MG/ML
25 INJECTION INTRAMUSCULAR; INTRAVENOUS; SUBCUTANEOUS EVERY 30 MIN PRN
Status: DISCONTINUED | OUTPATIENT
Start: 2023-12-13 | End: 2023-12-14 | Stop reason: HOSPADM

## 2023-12-13 RX ORDER — ALBUTEROL SULFATE 90 UG/1
1-2 AEROSOL, METERED RESPIRATORY (INHALATION)
Status: DISCONTINUED | OUTPATIENT
Start: 2023-12-13 | End: 2023-12-14 | Stop reason: HOSPADM

## 2023-12-13 RX ORDER — PROCHLORPERAZINE MALEATE 5 MG
5 TABLET ORAL EVERY 6 HOURS PRN
Status: DISCONTINUED | OUTPATIENT
Start: 2023-12-13 | End: 2023-12-14 | Stop reason: HOSPADM

## 2023-12-13 RX ORDER — EPINEPHRINE 1 MG/ML
0.3 INJECTION, SOLUTION, CONCENTRATE INTRAVENOUS EVERY 5 MIN PRN
Status: DISCONTINUED | OUTPATIENT
Start: 2023-12-13 | End: 2023-12-14 | Stop reason: HOSPADM

## 2023-12-13 RX ADMIN — LUTETIUM LU 177 VIPIVOTIDE TETRAXETAN 200 MILLICURIE: 27 INJECTION, SOLUTION INTRAVENOUS at 13:41

## 2023-12-13 NOTE — PROGRESS NOTES
[facility] Nursing Note:    Patient presents today for Pluvicto therapy, dose 3 of  6  Patient seen by provider today: No   present during visit today: NOT APPLICABLE      Intravenous Access:  Peripheral IV placed     Treatment Conditions:  Labs done on  11/24/2023    Results reviewed, labs Met treatment parameters, ok to proceed with treatment.           Post Infusion Assessment:  Patient tolerated infusion without incident.    PIV - No evidence of extravasations, access discontinued per protocol.         Discharge Plan:   Patient will return as scheduled for next appointment.   Patient discharged at 1400pm  in stable condition accompanied by: Self  Departure Mode: Ambulatory

## 2024-01-16 ENCOUNTER — ONCOLOGY VISIT (OUTPATIENT)
Dept: ONCOLOGY | Facility: CLINIC | Age: 84
End: 2024-01-16
Payer: COMMERCIAL

## 2024-01-16 VITALS
SYSTOLIC BLOOD PRESSURE: 104 MMHG | WEIGHT: 132.2 LBS | TEMPERATURE: 98 F | HEART RATE: 63 BPM | BODY MASS INDEX: 21.5 KG/M2 | DIASTOLIC BLOOD PRESSURE: 63 MMHG | OXYGEN SATURATION: 96 % | RESPIRATION RATE: 20 BRPM

## 2024-01-16 DIAGNOSIS — C61 PROSTATE CA (H): Primary | ICD-10-CM

## 2024-01-16 PROCEDURE — 99214 OFFICE O/P EST MOD 30 MIN: CPT

## 2024-01-16 PROCEDURE — G0463 HOSPITAL OUTPT CLINIC VISIT: HCPCS

## 2024-01-16 RX ORDER — LORAZEPAM 0.5 MG/1
.5-1 TABLET ORAL EVERY 6 HOURS PRN
Status: CANCELLED
Start: 2024-01-24

## 2024-01-16 RX ORDER — PROCHLORPERAZINE MALEATE 5 MG
5-10 TABLET ORAL EVERY 6 HOURS PRN
Status: CANCELLED
Start: 2024-01-24

## 2024-01-16 RX ORDER — MEPERIDINE HYDROCHLORIDE 25 MG/ML
25 INJECTION INTRAMUSCULAR; INTRAVENOUS; SUBCUTANEOUS EVERY 30 MIN PRN
Status: CANCELLED | OUTPATIENT
Start: 2024-01-24

## 2024-01-16 RX ORDER — ALBUTEROL SULFATE 90 UG/1
1-2 AEROSOL, METERED RESPIRATORY (INHALATION)
Status: CANCELLED
Start: 2024-01-24

## 2024-01-16 RX ORDER — EPINEPHRINE 1 MG/ML
0.3 INJECTION, SOLUTION INTRAMUSCULAR; SUBCUTANEOUS EVERY 5 MIN PRN
Status: CANCELLED | OUTPATIENT
Start: 2024-01-24

## 2024-01-16 RX ORDER — DIPHENHYDRAMINE HYDROCHLORIDE 50 MG/ML
50 INJECTION INTRAMUSCULAR; INTRAVENOUS
Status: CANCELLED
Start: 2024-01-24

## 2024-01-16 RX ORDER — CALCIUM CARBONATE 500(1250)
1 TABLET ORAL 2 TIMES DAILY
COMMUNITY

## 2024-01-16 RX ORDER — ALBUTEROL SULFATE 0.83 MG/ML
2.5 SOLUTION RESPIRATORY (INHALATION)
Status: CANCELLED | OUTPATIENT
Start: 2024-01-24

## 2024-01-16 RX ORDER — ONDANSETRON 4 MG/1
8 TABLET, FILM COATED ORAL
Status: CANCELLED | OUTPATIENT
Start: 2024-01-24

## 2024-01-16 RX ORDER — LORAZEPAM 2 MG/ML
.5-1 INJECTION INTRAMUSCULAR EVERY 6 HOURS PRN
Status: CANCELLED | OUTPATIENT
Start: 2024-01-24

## 2024-01-16 RX ORDER — METHYLPREDNISOLONE SODIUM SUCCINATE 125 MG/2ML
125 INJECTION, POWDER, LYOPHILIZED, FOR SOLUTION INTRAMUSCULAR; INTRAVENOUS
Status: CANCELLED
Start: 2024-01-24

## 2024-01-16 ASSESSMENT — PAIN SCALES - GENERAL: PAINLEVEL: NO PAIN (0)

## 2024-01-16 NOTE — Clinical Note
1/16/2024         RE: Liborio Lira  [address] [facility] [address]        Dear Colleague,    Thank you for referring your patient, Liborio Lira, to the [facility]. Please see a copy of my visit note below.    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 Maria Teresa Vang ([facility])  Current Treatment and intent: referred for palliative 5th line Ksenia-177-PSMA    Treatment Summary:   1/29/20 to present: leuprolide q3m  1/29/20 to present: zoledronic acid monthly  1/29/20 to 8/28/20: docetaxel x11 cycles (PSA 10,824 ? 16.47)  11/16/21 to 2/14/22: bicalutamide  2/17/22 to 11/8/22: enzalutamide  11/8/22 to 4/24/23: cabazitaxel, carboplatin, prednisone x9 cycles  Pluvicto times 3 cycles, initiated 9/15/2023. PSA just prior (8/24/23) was 173.5. PSA was 153.55 (10/16/2023) prior to cycle 2. PSA 46.00 prior to cycle 3. He is back today for consideration of cycle 4.        Interval History:   Jeannette is back.   He is feeling very well. He can't believe how much better pluvicto is compared to chemotherapy. He is thankful for this.   Eating and drinking well.   He continues to farm some.   He has dry mouth, increasing fluids for this.   No fevers or chills.   No chest pain, shortness of breath, or cough. He has stable asthma.   He denies recent falls.    No nausea or vomiting. No fever or chills, no night sweats.   No urinary frequency, retention or urgency; no hematuria.   Denies constipation or diarrhea.     ROS: 10 point ROS neg other than the symptoms noted above in the HPI.    Hem/onc History:   Edit Oncology History  Oncology History Overview Note   Jeannette initially presented to his PCP with a lump on the left side of his neck on 9/16/2019.  He underwent a CT of the  neck 9/20/19 that showed left cervical adenopathy which was subsequently biopsied 10/4/2019 with results consistent with metastatic prostate adenocarcinoma.  At that time, his PSA was 8,970 on 10/10/2019.  He subsequently underwent a bone scan and CT AP on 11/20/2019 that showed widespread bony metastatic disease and a large pelvic mass directly invading the urinary bladder with bulky retroperitoneal and pelvic sidewall lymphadenopathy.    He started first-line palliative treatment with leuprolide and docetaxel on 1/29/2020 and was treated with 11 total cycles of docetaxel ending on 8/28/2020. A restaging CT CAP and bone scan 9/16/20 showed apparent resolution of the invasive prostate mass and lymphadenopathy, and improved but persistent diffuse osseous metastatic disease. His PSA decreased from a peak of 10,824 down to a sherrie of 16.47 on 6/9/2021 in response to this treatment.      His PSA then slowly drifted up to 30.97 on 11/12/2021 at which time palliative second line bicalutamide was added to leuprolide.  His PSA unfortunately continued to rise to 52.30 on 2/14/2022 for which she was switched from bicalutamide to palliative third line enzalutamide.  His PSA decreased to a sherrie of 16.83 on 7/15/2022 in response to enzalutamide, then started to rise again to 28.72 on 8/11/2022 for which he was changed from enzalutamide to palliative fourth line cabazitaxel, carboplatin, and prednisone.  He received a total of 9 cycles of cabazitaxel, carboplatin, and prednisone ending on 4/24/2023 during which time his PSA was stable in the 40-50 range, however started to increase to the 70-90 range after completing chemotherapy.  He therefore underwent a PSMA PET-CT scan on 7/6/2023 that showed hypermetabolic activity in the majority the prostate and widespread osseous metastatic disease for which she was referred to [facility] for lutetium-177-PSMA therapy.     Prostate cancer (H)   10/4/2019 Pathology    Left neck lymph node  biopsy  UMN overread:  Final Diagnosis   CASE FROM [place], [place] ([id number], OBTAINED 10/04/2019):  LYMPH NODE, LEFT NECK, CORE BIOPSY:  -METASTATIC PROSTATE ADENOCARCINOMA, see comment.      10/10/2019 Tumor Markers    Date PSA Treatment   10/10/2019 8,970.00 High      1/29/2020 10,824.68 High  Started leuprolide, docetaxel    2/19/2020 3,497.46 High       3/11/2020 1,256.01 High       4/2/2020 727.72 High     4/24/2020 633.57 High     5/15/2020 471.45 High     6/4/2020 332.91 High     6/26/2020 229.82 High     7/17/2020 185.36 High     8/7/2020 149.70 High     8/28/2020 124.63 High  Docetaxel x11 cycles completed   12/4/2020 42.69 High     3/15/2021 21.50 High     4/12/2021 17.52 High     5/14/2021 16.84 High     6/9/2021 16.47 High     8/20/2021 19.90 High     9/20/2021 22.72 High     10/20/2021 29.24 High     11/12/2021 30.97 High  Added bicalutamide to leuprolide   12/16/2021 29.96 High     1/18/2022 35.33 High     2/14/2022 52.30 High  Changed bicalutamide to enzalutamide   4/22/2022 19.22 High     5/20/2022 17.88 High     6/17/2022 17.92 High     7/15/2022 16.83 High     8/12/2022 19.11 High     9/12/2022 22.04 High     10/11/2022 25.04 High     11/8/2022 28.72 High  Changed enzalutamide to cabazitaxel, carboplatin   11/29/2022 42.73 High     12/20/2022 53.16 High     1/10/2023 52.72 High     1/31/2023 47.75 High     3/14/2023 47.78 High     4/3/2023 50.89 High     4/24/2023 93.83 High  Cabazitaxel, carboplatin x9 cycles completed   5/15/2023 73.02 High     6/26/2023 81.78 High     8/7/2023 117.08 High          7/6/2023 Imaging    PSMA PET  IMPRESSION:   1. Hypermetabolic activity involving a majority of the prostate concerning for   residual disease.   2. Hypermetabolic activity involving numerous osseous structures as discussed   above consistent with metastatic disease.      9/15/2023 -  Chemotherapy    IP ONC Prostate Cancer (PMSA+) - Lutetium Ksenia-177 vipivotide tetraxetan  Plan Provider:  Faby Toro MD  Treatment goal: Palliative  Line of treatment: [No plan line of treatment]     Subjective  Past Medical History:   Diagnosis Date    Prostate cancer (H) 2023     Past Surgical History:   Procedure Laterality Date    IR LYMPH NODE BIOPSY  10/4/2019     Social History     Socioeconomic History    Marital status: Single   Tobacco Use    Smoking status: Never     Passive exposure: Never    Smokeless tobacco: Current     Types: Chew, Snuff   Substance and Sexual Activity    Alcohol use: Yes    Drug use: Never      Family History   Problem Relation Age of Onset    No Known Problems Son        Current Outpatient Medications   Medication Sig    cod liver oil CAPS capsule Take 2 capsules by mouth daily    cyanocobalamin (VITAMIN B-12) 100 MCG tablet Take 100 mcg by mouth daily    diphenhydrAMINE (BENADRYL) 25 MG capsule Take 25 mg by mouth every 6 hours as needed for allergies (Patient not taking: Reported on 2023)    loratadine (CLARITIN REDITABS) 10 MG ODT Take 10 mg by mouth daily Over the counter med (used instead of benadryl)    magnesium 250 MG tablet Take 1 tablet by mouth daily    pyridOXINE (VITAMIN B6) 100 MG TABS Take 25 mg by mouth daily    vitamin C with B complex (B COMPLEX-C) tablet Take 1 tablet by mouth daily (Patient not taking: Reported on 2023)     No current facility-administered medications for this visit.        Objective  ECO-1  Physical Exam:   Blood pressure 104/63, pulse 63, temperature 98  F (36.7  C), temperature source Oral, resp. rate 20, weight 60 kg (132 lb 3.2 oz), SpO2 96%.  General: No acute distress  HEENT: Sclera anicteric. Oral mucosa pink and moist.  No mucositis or thrush  Lymph: No lymphadenopathy in neck  Heart: Regular, rate, and rhythm  Lungs: Clear to ascultation bilaterally  Abdomen: Positive bowel sounds. Soft, non-distended, non-tender. No organomegaly or mass.   Extremities: no lower extremity edema  Neuro: Cranial nerves grossly  intact  Rash: none    Data:   CMP 1/5/24:       [facility]  Outside Information      Contains abnormal data COMPREHENSIVE METABOLIC PANEL  Specimen: Blood - Venous blood (substance)  Component  Ref Range & Units 11 d ago   Sodium  136 - 146 mmol/L 139   Potassium  3.5 - 5.1 mmol/L 4.1   Chloride  98 - 107 mmol/L 106   CO2  22 - 29 mmol/L 24   Anion Gap  10.0 - 20.0 mmol/L 13.1   Creatinine  0.72 - 1.25 mg/dL 0.96   Blood Urea Nitrogen  10.0 - 20.0 mg/dL 26.3 High    BUN/Creatinine Ratio  11.70 - 22.90 ratio 27.40 High    Calcium, Total  8.6 - 10.5 mg/dL 9.2   Glucose  70 - 100 mg/dL 84   eGFR  >=60 mL/min/1.73m(2) >60   Total Protein  6.0 - 8.0 g/dL 6.4   Albumin  3.4 - 4.8 g/dL 4.0   Globulin  2.0 - 3.5 g/dL 2.4   Albumin/Globulin Ratio  1.0 - 2.0 1.7   Aspartate Aminotransferase (AST)  5 - 41 U/L 34   Alanine Aminotransferase (ALT)  8 - 45 U/L 37   Alkaline Phosphatase  50 - 136 U/L 70   Bilirubin, Total  0.2 - 1.2 mg/dL 0.4   eCrCl (Rx) - Adults  mL/min 51.1   Fasting Status N/A     CBC 01/05/24:  Component  Ref Range & Units 11 d ago   WBC Count, Corrected  3.9 - 11.9 10(3)/uL 5.2   RBC Count  4.08 - 5.79 10(6)/uL 4.02 Low    Hemoglobin  13.1 - 17.1 g/dL 12.9 Low    Hematocrit  38.7 - 51.4 % 37.3 Low    MCV  82.9 - 100.6 fL 92.9   MCH  27.6 - 33.2 pg 32.0   MCHC  32.0 - 36.0 g/dL 34.4   RDW  10.0 - 16.2 % 13.8   Platelets  179 - 450 10(3)/uL 162 Low    MPV  7.4 - 10.4 fL 6.5 Low    % Neutrophils  43.0 - 80.0 % 80.0   % Lymphocytes  16.0 - 49.0 % 9.9 Low    % Monocytes  0.0 - 10.0 % 7.3   % Eosinophils  0.0 - 7.0 % 2.2   % Basophils  0.0 - 2.0 % 0.6   Abs Neutrophils  1.6 - 8.1 10(3)/uL 4.2   Abs Lymphocytes  0.9 - 3.5 10(3)/uL 0.5 Low    Abs Monocytes  0.0 - 1.1 10(3)/uL 0.4   Abs Eosinophils  0.0 - 0.8 10(3)/uL 0.1   Abs Basophils  0.0 - 0.2 10(3)/uL 0.0       PSA:    Component      Latest Ref Rng 8/24/2023  4:51 PM   PSA Tumor Marker      ng/mL 173.50         Recent Data from [facility]  Related to PROSTATE SPECIFIC AG, DIAGNOSTIC  Component 01/05/24 12/22/23 10/16/23 08/07/23 06/26/23 05/15/23   PSA, Total 24.37 High  46.25 High  153.55 High  117.08 High  81.78 High  73.02 High    View all related data         ASSESSMENT AND PLAN  Stage IV castration resistant prostate adenocarcinoma: Jeannette is status post 2 cycles of Pluvicto with really a remarkable response.  His PSA has dropped from 173 prior to cycle 1 to 24 on 1/5/24.   D1C4 Pluvicto 01/24/24 as scheduled. Labs from 1/5/24 appropriate to proceed. Patient also plans to have repeat labs on 1/18/24 during his follow up with his local oncologist Dr. Vang.    S/P recent Eligard 22.5 mg, 11/24/2023. Continue every 3 months with Dr. Vang.  Monthly Zoledronic acid with Dr. Vang.   RTC in 6 weeks for cycle 5 of a planned 6.     34 minutes spent on the date of the encounter doing chart review, review of test results, patient visit, and documentation     Ofe Farr PA-C                                                           Again, thank you for allowing me to participate in the care of your patient.        Sincerely,        Ofe Farr PA-C

## 2024-01-16 NOTE — NURSING NOTE
"Oncology Rooming Note    January 16, 2024 12:06 PM   Liborio Lira is a 83 year old male who presents for:    Chief Complaint   Patient presents with    Oncology Clinic Visit     Prostate cancer     Initial Vitals: /63 (BP Location: Right arm, Patient Position: Sitting, Cuff Size: Adult Regular)   Pulse 63   Temp 98  F (36.7  C) (Oral)   Resp 20   Wt 60 kg (132 lb 3.2 oz)   SpO2 96%   BMI 21.50 kg/m   Estimated body mass index is 21.5 kg/m  as calculated from the following:    Height as of 8/24/23: 1.67 m (5' 5.75\").    Weight as of this encounter: 60 kg (132 lb 3.2 oz). Body surface area is 1.67 meters squared.  No Pain (0) Comment: Data Unavailable   No LMP for male patient.  Allergies reviewed: Yes  Medications reviewed: Yes    Medications: Medication refills not needed today.  Pharmacy name entered into [employer]: [email or web address] [address] [place] [address]    Frailty Screening:   Is the patient here for a new oncology consult visit in cancer care? 2. No      Clinical concerns: Wants to know progress for cancer currently. Also is curious is grapeseed medication seen on advertisement would help with feeling cold due to dilating blood vessels       Marjorie Elkins              "

## 2024-01-16 NOTE — PROGRESS NOTES
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 Maria Teresa Vang ([facility])  Current Treatment and intent: referred for palliative 5th line Ksenia-177-PSMA    Treatment Summary:   1/29/20 to present: leuprolide q3m  1/29/20 to present: zoledronic acid monthly  1/29/20 to 8/28/20: docetaxel x11 cycles (PSA 10,824 ? 16.47)  11/16/21 to 2/14/22: bicalutamide  2/17/22 to 11/8/22: enzalutamide  11/8/22 to 4/24/23: cabazitaxel, carboplatin, prednisone x9 cycles  Pluvicto times 3 cycles, initiated 9/15/2023. PSA just prior (8/24/23) was 173.5. PSA was 153.55 (10/16/2023) prior to cycle 2. PSA 46.00 prior to cycle 3. He is back today for consideration of cycle 4.        Interval History:   Jeannette is back.   He is feeling very well. He can't believe how much better pluvicto is compared to chemotherapy. He is thankful for this.   Eating and drinking well.   He continues to farm some.   He has dry mouth, increasing fluids for this.   No fevers or chills.   No chest pain, shortness of breath, or cough. He has stable asthma.   He denies recent falls.    No nausea or vomiting. No fever or chills, no night sweats.   No urinary frequency, retention or urgency; no hematuria.   Denies constipation or diarrhea.     ROS: 10 point ROS neg other than the symptoms noted above in the HPI.    Hem/onc History:   Edit Oncology History  Oncology History Overview Note   Jeannette initially presented to his PCP with a lump on the left side of his neck on 9/16/2019.  He underwent a CT of the neck 9/20/19 that showed left cervical adenopathy which was subsequently biopsied 10/4/2019 with results consistent with metastatic prostate adenocarcinoma.  At that time, his PSA was 8,970 on 10/10/2019.  He subsequently underwent a bone scan and CT AP on  11/20/2019 that showed widespread bony metastatic disease and a large pelvic mass directly invading the urinary bladder with bulky retroperitoneal and pelvic sidewall lymphadenopathy.    He started first-line palliative treatment with leuprolide and docetaxel on 1/29/2020 and was treated with 11 total cycles of docetaxel ending on 8/28/2020. A restaging CT CAP and bone scan 9/16/20 showed apparent resolution of the invasive prostate mass and lymphadenopathy, and improved but persistent diffuse osseous metastatic disease. His PSA decreased from a peak of 10,824 down to a sherrie of 16.47 on 6/9/2021 in response to this treatment.      His PSA then slowly drifted up to 30.97 on 11/12/2021 at which time palliative second line bicalutamide was added to leuprolide.  His PSA unfortunately continued to rise to 52.30 on 2/14/2022 for which she was switched from bicalutamide to palliative third line enzalutamide.  His PSA decreased to a sherrie of 16.83 on 7/15/2022 in response to enzalutamide, then started to rise again to 28.72 on 8/11/2022 for which he was changed from enzalutamide to palliative fourth line cabazitaxel, carboplatin, and prednisone.  He received a total of 9 cycles of cabazitaxel, carboplatin, and prednisone ending on 4/24/2023 during which time his PSA was stable in the 40-50 range, however started to increase to the 70-90 range after completing chemotherapy.  He therefore underwent a PSMA PET-CT scan on 7/6/2023 that showed hypermetabolic activity in the majority the prostate and widespread osseous metastatic disease for which she was referred to [facility] for lutetium-177-PSMA therapy.     Prostate cancer (H)   10/4/2019 Pathology    Left neck lymph node biopsy  N overread:  Final Diagnosis   CASE FROM [place], [place] ([id number], OBTAINED 10/04/2019):  LYMPH NODE, LEFT NECK, CORE BIOPSY:  -METASTATIC PROSTATE ADENOCARCINOMA, see comment.      10/10/2019 Tumor Markers    Date PSA Treatment    10/10/2019 8,970.00 High      1/29/2020 10,824.68 High  Started leuprolide, docetaxel    2/19/2020 3,497.46 High       3/11/2020 1,256.01 High       4/2/2020 727.72 High     4/24/2020 633.57 High     5/15/2020 471.45 High     6/4/2020 332.91 High     6/26/2020 229.82 High     7/17/2020 185.36 High     8/7/2020 149.70 High     8/28/2020 124.63 High  Docetaxel x11 cycles completed   12/4/2020 42.69 High     3/15/2021 21.50 High     4/12/2021 17.52 High     5/14/2021 16.84 High     6/9/2021 16.47 High     8/20/2021 19.90 High     9/20/2021 22.72 High     10/20/2021 29.24 High     11/12/2021 30.97 High  Added bicalutamide to leuprolide   12/16/2021 29.96 High     1/18/2022 35.33 High     2/14/2022 52.30 High  Changed bicalutamide to enzalutamide   4/22/2022 19.22 High     5/20/2022 17.88 High     6/17/2022 17.92 High     7/15/2022 16.83 High     8/12/2022 19.11 High     9/12/2022 22.04 High     10/11/2022 25.04 High     11/8/2022 28.72 High  Changed enzalutamide to cabazitaxel, carboplatin   11/29/2022 42.73 High     12/20/2022 53.16 High     1/10/2023 52.72 High     1/31/2023 47.75 High     3/14/2023 47.78 High     4/3/2023 50.89 High     4/24/2023 93.83 High  Cabazitaxel, carboplatin x9 cycles completed   5/15/2023 73.02 High     6/26/2023 81.78 High     8/7/2023 117.08 High          7/6/2023 Imaging    PSMA PET  IMPRESSION:   1. Hypermetabolic activity involving a majority of the prostate concerning for   residual disease.   2. Hypermetabolic activity involving numerous osseous structures as discussed   above consistent with metastatic disease.      9/15/2023 -  Chemotherapy    IP ONC Prostate Cancer (PMSA+) - Lutetium Ksenia-177 vipivotide tetraxetan  Plan Provider: Faby Toro MD  Treatment goal: Palliative  Line of treatment: [No plan line of treatment]     Subjective   Past Medical History:   Diagnosis Date    Prostate cancer (H) 08/24/2023     Past Surgical History:   Procedure Laterality Date    IR LYMPH NODE  BIOPSY  10/4/2019     Social History     Socioeconomic History    Marital status: Single   Tobacco Use    Smoking status: Never     Passive exposure: Never    Smokeless tobacco: Current     Types: Chew, Snuff   Substance and Sexual Activity    Alcohol use: Yes    Drug use: Never      Family History   Problem Relation Age of Onset    No Known Problems Son        Current Outpatient Medications   Medication Sig    cod liver oil CAPS capsule Take 2 capsules by mouth daily    cyanocobalamin (VITAMIN B-12) 100 MCG tablet Take 100 mcg by mouth daily    diphenhydrAMINE (BENADRYL) 25 MG capsule Take 25 mg by mouth every 6 hours as needed for allergies (Patient not taking: Reported on 2023)    loratadine (CLARITIN REDITABS) 10 MG ODT Take 10 mg by mouth daily Over the counter med (used instead of benadryl)    magnesium 250 MG tablet Take 1 tablet by mouth daily    pyridOXINE (VITAMIN B6) 100 MG TABS Take 25 mg by mouth daily    vitamin C with B complex (B COMPLEX-C) tablet Take 1 tablet by mouth daily (Patient not taking: Reported on 2023)     No current facility-administered medications for this visit.        Objective   ECO-1  Physical Exam:   Blood pressure 104/63, pulse 63, temperature 98  F (36.7  C), temperature source Oral, resp. rate 20, weight 60 kg (132 lb 3.2 oz), SpO2 96%.  General: No acute distress  HEENT: Sclera anicteric. Oral mucosa pink and moist.  No mucositis or thrush  Lymph: No lymphadenopathy in neck  Heart: Regular, rate, and rhythm  Lungs: Clear to ascultation bilaterally  Abdomen: Positive bowel sounds. Soft, non-distended, non-tender. No organomegaly or mass.   Extremities: no lower extremity edema  Neuro: Cranial nerves grossly intact  Rash: none    Data:   CMP 24:       [facility]  Outside Information      Contains abnormal data COMPREHENSIVE METABOLIC PANEL  Specimen: Blood - Venous blood (substance)  Component  Ref Range & Units 11 d ago   Sodium  136 -  146 mmol/L 139   Potassium  3.5 - 5.1 mmol/L 4.1   Chloride  98 - 107 mmol/L 106   CO2  22 - 29 mmol/L 24   Anion Gap  10.0 - 20.0 mmol/L 13.1   Creatinine  0.72 - 1.25 mg/dL 0.96   Blood Urea Nitrogen  10.0 - 20.0 mg/dL 26.3 High    BUN/Creatinine Ratio  11.70 - 22.90 ratio 27.40 High    Calcium, Total  8.6 - 10.5 mg/dL 9.2   Glucose  70 - 100 mg/dL 84   eGFR  >=60 mL/min/1.73m(2) >60   Total Protein  6.0 - 8.0 g/dL 6.4   Albumin  3.4 - 4.8 g/dL 4.0   Globulin  2.0 - 3.5 g/dL 2.4   Albumin/Globulin Ratio  1.0 - 2.0 1.7   Aspartate Aminotransferase (AST)  5 - 41 U/L 34   Alanine Aminotransferase (ALT)  8 - 45 U/L 37   Alkaline Phosphatase  50 - 136 U/L 70   Bilirubin, Total  0.2 - 1.2 mg/dL 0.4   eCrCl (Rx) - Adults  mL/min 51.1   Fasting Status N/A     CBC 01/05/24:  Component  Ref Range & Units 11 d ago   WBC Count, Corrected  3.9 - 11.9 10(3)/uL 5.2   RBC Count  4.08 - 5.79 10(6)/uL 4.02 Low    Hemoglobin  13.1 - 17.1 g/dL 12.9 Low    Hematocrit  38.7 - 51.4 % 37.3 Low    MCV  82.9 - 100.6 fL 92.9   MCH  27.6 - 33.2 pg 32.0   MCHC  32.0 - 36.0 g/dL 34.4   RDW  10.0 - 16.2 % 13.8   Platelets  179 - 450 10(3)/uL 162 Low    MPV  7.4 - 10.4 fL 6.5 Low    % Neutrophils  43.0 - 80.0 % 80.0   % Lymphocytes  16.0 - 49.0 % 9.9 Low    % Monocytes  0.0 - 10.0 % 7.3   % Eosinophils  0.0 - 7.0 % 2.2   % Basophils  0.0 - 2.0 % 0.6   Abs Neutrophils  1.6 - 8.1 10(3)/uL 4.2   Abs Lymphocytes  0.9 - 3.5 10(3)/uL 0.5 Low    Abs Monocytes  0.0 - 1.1 10(3)/uL 0.4   Abs Eosinophils  0.0 - 0.8 10(3)/uL 0.1   Abs Basophils  0.0 - 0.2 10(3)/uL 0.0       PSA:    Component      Latest Ref Rng 8/24/2023  4:51 PM   PSA Tumor Marker      ng/mL 173.50         Recent Data from [facility]  Related to PROSTATE SPECIFIC AG, DIAGNOSTIC  Component 01/05/24 12/22/23 10/16/23 08/07/23 06/26/23 05/15/23   PSA, Total 24.37 High  46.25 High  153.55 High  117.08 High  81.78 High  73.02 High    View all related data          ASSESSMENT AND PLAN  Stage IV castration resistant prostate adenocarcinoma: Jeannette is status post 2 cycles of Pluvicto with really a remarkable response.  His PSA has dropped from 173 prior to cycle 1 to 24 on 1/5/24.   D1C4 Pluvicto 01/24/24 as scheduled. Labs from 1/5/24 appropriate to proceed. Patient also plans to have repeat labs on 1/18/24 during his follow up with his local oncologist Dr. Vang.    S/P recent Eligard 22.5 mg, 11/24/2023. Continue every 3 months with Dr. Vang.  Monthly Zoledronic acid with Dr. Vang.   RTC in 6 weeks for cycle 5 of a planned 6.     34 minutes spent on the date of the encounter doing chart review, review of test results, patient visit, and documentation     Ofe Farr PA-C

## 2024-01-24 ENCOUNTER — HOSPITAL ENCOUNTER (OUTPATIENT)
Dept: NUCLEAR MEDICINE | Facility: CLINIC | Age: 84
Setting detail: NUCLEAR MEDICINE
Discharge: HOME | End: 2024-01-24
Attending: INTERNAL MEDICINE
Payer: COMMERCIAL

## 2024-01-24 DIAGNOSIS — C61 PROSTATE CANCER (H): Primary | ICD-10-CM

## 2024-01-24 PROCEDURE — A9607 HC RX 344: HCPCS

## 2024-01-24 PROCEDURE — 79101 NUCLEAR RX IV ADMIN: CPT | Mod: 26 | Performed by: RADIOLOGY

## 2024-01-24 PROCEDURE — 344N000001 HC RX 344

## 2024-01-24 PROCEDURE — 79101 NUCLEAR RX IV ADMIN: CPT

## 2024-01-24 PROCEDURE — 999N000127 HC STATISTIC PERIPHERAL IV START W US GUIDANCE

## 2024-01-24 RX ORDER — ALBUTEROL SULFATE 90 UG/1
1-2 AEROSOL, METERED RESPIRATORY (INHALATION)
Status: DISCONTINUED | OUTPATIENT
Start: 2024-01-24 | End: 2024-01-25 | Stop reason: HOSPADM

## 2024-01-24 RX ORDER — ONDANSETRON 8 MG/1
8 TABLET, FILM COATED ORAL
Status: DISCONTINUED | OUTPATIENT
Start: 2024-01-24 | End: 2024-01-25 | Stop reason: HOSPADM

## 2024-01-24 RX ORDER — METHYLPREDNISOLONE SODIUM SUCCINATE 125 MG/2ML
125 INJECTION, POWDER, LYOPHILIZED, FOR SOLUTION INTRAMUSCULAR; INTRAVENOUS
Status: DISCONTINUED | OUTPATIENT
Start: 2024-01-24 | End: 2024-01-25 | Stop reason: HOSPADM

## 2024-01-24 RX ORDER — MEPERIDINE HYDROCHLORIDE 25 MG/ML
25 INJECTION INTRAMUSCULAR; INTRAVENOUS; SUBCUTANEOUS EVERY 30 MIN PRN
Status: DISCONTINUED | OUTPATIENT
Start: 2024-01-24 | End: 2024-01-25 | Stop reason: HOSPADM

## 2024-01-24 RX ORDER — PROCHLORPERAZINE MALEATE 5 MG
5-10 TABLET ORAL EVERY 6 HOURS PRN
Status: DISCONTINUED | OUTPATIENT
Start: 2024-01-24 | End: 2024-01-25 | Stop reason: HOSPADM

## 2024-01-24 RX ORDER — EPINEPHRINE 1 MG/ML
0.3 INJECTION, SOLUTION, CONCENTRATE INTRAVENOUS EVERY 5 MIN PRN
Status: DISCONTINUED | OUTPATIENT
Start: 2024-01-24 | End: 2024-01-25 | Stop reason: HOSPADM

## 2024-01-24 RX ORDER — LORAZEPAM 2 MG/ML
.5-1 INJECTION INTRAMUSCULAR EVERY 6 HOURS PRN
Status: DISCONTINUED | OUTPATIENT
Start: 2024-01-24 | End: 2024-01-25 | Stop reason: HOSPADM

## 2024-01-24 RX ORDER — LORAZEPAM 0.5 MG/1
.5-1 TABLET ORAL EVERY 6 HOURS PRN
Status: DISCONTINUED | OUTPATIENT
Start: 2024-01-24 | End: 2024-01-25 | Stop reason: HOSPADM

## 2024-01-24 RX ORDER — DIPHENHYDRAMINE HYDROCHLORIDE 50 MG/ML
50 INJECTION INTRAMUSCULAR; INTRAVENOUS
Status: DISCONTINUED | OUTPATIENT
Start: 2024-01-24 | End: 2024-01-25 | Stop reason: HOSPADM

## 2024-01-24 RX ORDER — ALBUTEROL SULFATE 0.83 MG/ML
2.5 SOLUTION RESPIRATORY (INHALATION)
Status: DISCONTINUED | OUTPATIENT
Start: 2024-01-24 | End: 2024-01-25 | Stop reason: HOSPADM

## 2024-01-24 RX ADMIN — LUTETIUM LU 177 VIPIVOTIDE TETRAXETAN 200 MILLICURIE: 27 INJECTION, SOLUTION INTRAVENOUS at 14:10

## 2024-01-24 NOTE — PROGRESS NOTES
[facility] Nursing Note:    Patient presents today for Pluvicto therapy, dose 4 of  6  Patient seen by provider today: Yes: Dr Johansen   present during visit today: NOT APPLICABLE      Intravenous Access:  Peripheral IV placed     Treatment Conditions:  Labs done on  1/5/24    Results reviewed, labs Met treatment parameters, ok to proceed with treatment.           Post Infusion Assessment:  Patient tolerated infusion without incident.    PIV - No evidence of extravasations, access discontinued per protocol.         Discharge Plan:   Patient will return as scheduled for next appointment.   Patient to imaging at 1400  in stable condition accompanied by: self  Departure Mode: Ambulatory

## 2024-02-26 NOTE — PROGRESS NOTES
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 Maria Teresa Vang ([facility])  Current Treatment and intent: referred for palliative 5th line Ksenia-177-PSMA    Treatment Summary:   1/29/20 to present: leuprolide q3m  1/29/20 to present: zoledronic acid monthly  1/29/20 to 8/28/20: docetaxel x11 cycles (PSA 10,824 ? 16.47)  11/16/21 to 2/14/22: bicalutamide  2/17/22 to 11/8/22: enzalutamide  11/8/22 to 4/24/23: cabazitaxel, carboplatin, prednisone x9 cycles  Pluvicto times 3 cycles, initiated 9/15/2023. PSA just prior (8/24/23) was 173.5. PSA was 153.55 (10/16/2023) prior to cycle 2. PSA 46.00 prior to cycle 3. PSA 02/19/2024 continues to decline to 12.66 prior to cycle 4.     Interval History:   Jeannette is back.   He is feeling very well. He can't believe how much better pluvicto is compared to chemotherapy. He is thankful for this.   Eating and drinking well. He is gaining weight which is has been trying to do.   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   He has dry mouth, increasing fluids for this with success.   Breathing is stable. He has asthma. No chest pain.   No fevers or chills.   He denies recent falls.    No nausea or vomiting.   No urinary or bowel concerns.    No bone pains.      ROS: 10 point ROS neg other than the symptoms noted above in the HPI.    Hem/onc History:   Edit Oncology History  Oncology History Overview Note   Jeannette initially presented to his PCP with a lump on the left side of his neck on 9/16/2019.  He underwent a CT of the neck 9/20/19 that showed left cervical adenopathy which was subsequently biopsied 10/4/2019 with results consistent with metastatic prostate adenocarcinoma.  At that time, his PSA was 8,970 on 10/10/2019.  He subsequently  underwent a bone scan and CT AP on 11/20/2019 that showed widespread bony metastatic disease and a large pelvic mass directly invading the urinary bladder with bulky retroperitoneal and pelvic sidewall lymphadenopathy.    He started first-line palliative treatment with leuprolide and docetaxel on 1/29/2020 and was treated with 11 total cycles of docetaxel ending on 8/28/2020. A restaging CT CAP and bone scan 9/16/20 showed apparent resolution of the invasive prostate mass and lymphadenopathy, and improved but persistent diffuse osseous metastatic disease. His PSA decreased from a peak of 10,824 down to a sherrie of 16.47 on 6/9/2021 in response to this treatment.      His PSA then slowly drifted up to 30.97 on 11/12/2021 at which time palliative second line bicalutamide was added to leuprolide.  His PSA unfortunately continued to rise to 52.30 on 2/14/2022 for which she was switched from bicalutamide to palliative third line enzalutamide.  His PSA decreased to a sherrie of 16.83 on 7/15/2022 in response to enzalutamide, then started to rise again to 28.72 on 8/11/2022 for which he was changed from enzalutamide to palliative fourth line cabazitaxel, carboplatin, and prednisone.  He received a total of 9 cycles of cabazitaxel, carboplatin, and prednisone ending on 4/24/2023 during which time his PSA was stable in the 40-50 range, however started to increase to the 70-90 range after completing chemotherapy.  He therefore underwent a PSMA PET-CT scan on 7/6/2023 that showed hypermetabolic activity in the majority the prostate and widespread osseous metastatic disease for which she was referred to [facility] for lutetium-177-PSMA therapy.     Prostate cancer (H)   10/4/2019 Pathology    Left neck lymph node biopsy  N overread:  Final Diagnosis   CASE FROM [place], [place] ([id number], OBTAINED 10/04/2019):  LYMPH NODE, LEFT NECK, CORE BIOPSY:  -METASTATIC PROSTATE ADENOCARCINOMA, see comment.      10/10/2019 Tumor  Markers    Date PSA Treatment   10/10/2019 8,970.00 High      1/29/2020 10,824.68 High  Started leuprolide, docetaxel    2/19/2020 3,497.46 High       3/11/2020 1,256.01 High       4/2/2020 727.72 High     4/24/2020 633.57 High     5/15/2020 471.45 High     6/4/2020 332.91 High     6/26/2020 229.82 High     7/17/2020 185.36 High     8/7/2020 149.70 High     8/28/2020 124.63 High  Docetaxel x11 cycles completed   12/4/2020 42.69 High     3/15/2021 21.50 High     4/12/2021 17.52 High     5/14/2021 16.84 High     6/9/2021 16.47 High     8/20/2021 19.90 High     9/20/2021 22.72 High     10/20/2021 29.24 High     11/12/2021 30.97 High  Added bicalutamide to leuprolide   12/16/2021 29.96 High     1/18/2022 35.33 High     2/14/2022 52.30 High  Changed bicalutamide to enzalutamide   4/22/2022 19.22 High     5/20/2022 17.88 High     6/17/2022 17.92 High     7/15/2022 16.83 High     8/12/2022 19.11 High     9/12/2022 22.04 High     10/11/2022 25.04 High     11/8/2022 28.72 High  Changed enzalutamide to cabazitaxel, carboplatin   11/29/2022 42.73 High     12/20/2022 53.16 High     1/10/2023 52.72 High     1/31/2023 47.75 High     3/14/2023 47.78 High     4/3/2023 50.89 High     4/24/2023 93.83 High  Cabazitaxel, carboplatin x9 cycles completed   5/15/2023 73.02 High     6/26/2023 81.78 High     8/7/2023 117.08 High          7/6/2023 Imaging    PSMA PET  IMPRESSION:   1. Hypermetabolic activity involving a majority of the prostate concerning for   residual disease.   2. Hypermetabolic activity involving numerous osseous structures as discussed   above consistent with metastatic disease.      9/15/2023 -  Chemotherapy    IP ONC Prostate Cancer (PMSA+) - Lutetium Ksenia-177 vipivotide tetraxetan  Plan Provider: Faby Toro MD  Treatment goal: Palliative  Line of treatment: [No plan line of treatment]     Subjective   Past Medical History:   Diagnosis Date    Prostate cancer (H) 08/24/2023     Past Surgical History:   Procedure  "Laterality Date    IR LYMPH NODE BIOPSY  10/4/2019     Social History     Socioeconomic History    Marital status: Single   Tobacco Use    Smoking status: Never     Passive exposure: Never    Smokeless tobacco: Current     Types: Chew, Snuff   Substance and Sexual Activity    Alcohol use: Yes    Drug use: Never      Family History   Problem Relation Age of Onset    No Known Problems Son        Current Outpatient Medications   Medication Sig    calcium carbonate (OS-KIMBERLY) 500 MG tablet Take 1 tablet by mouth 2 times daily Unsure of dosage    cod liver oil CAPS capsule Take 2 capsules by mouth daily    cyanocobalamin (VITAMIN B-12) 100 MCG tablet Take 100 mcg by mouth daily    diphenhydrAMINE (BENADRYL) 25 MG capsule Take 25 mg by mouth every 6 hours as needed for allergies (Patient not taking: Reported on 2023)    loratadine (CLARITIN REDITABS) 10 MG ODT Take 10 mg by mouth daily Over the counter med (used instead of benadryl)    magnesium 250 MG tablet Take 1 tablet by mouth daily    pyridOXINE (VITAMIN B6) 100 MG TABS Take 25 mg by mouth daily    vitamin C with B complex (B COMPLEX-C) tablet Take 1 tablet by mouth daily (Patient not taking: Reported on 2023)     No current facility-administered medications for this visit.        Objective   ECO-1  Physical Exam:   Blood pressure 120/72, pulse 75, temperature 98.2  F (36.8  C), resp. rate 16, height 1.67 m (5' 5.75\"), weight 60.6 kg (133 lb 9.6 oz), SpO2 97%.  General: No acute distress  HEENT: Sclera anicteric. Oral mucosa pink and moist.  No mucositis or thrush  Lymph: No lymphadenopathy in neck  Heart: Subtle murmur. Regular, rate, and rhythm.   Lungs: Clear to ascultation bilaterally  Abdomen: Positive bowel sounds. Soft, non-distended, non-tender. No organomegaly or mass.   Extremities: no lower extremity edema  Neuro: Cranial nerves grossly intact  Rash: none    Data:   CMP 24:       Component  Ref Range & Units 7 d ago Comments   Sodium  136 " - 146 mmol/L 137    Potassium  3.5 - 5.1 mmol/L 4.2    Chloride  98 - 107 mmol/L 107    CO2  22 - 29 mmol/L 21 Low     Anion Gap  10.0 - 20.0 mmol/L 13.2    Creatinine  0.72 - 1.25 mg/dL 1.09    Blood Urea Nitrogen  10.0 - 20.0 mg/dL 15.1    BUN/Creatinine Ratio  11.70 - 22.90 ratio 13.85    Calcium, Total  8.6 - 10.5 mg/dL 9.0    Glucose  70 - 100 mg/dL 95    eGFR  >=60 mL/min/1.73m(2) >60 Calculation based on the Chronic Kidney Disease Epidemiology Collaboration (CKD-EPI) equation refit without adjustment for race.  GFR reference range is not established in patients >70 years old.   Total Protein  6.0 - 8.0 g/dL 6.8    Albumin  3.4 - 4.8 g/dL 4.4    Globulin  2.0 - 3.5 g/dL 2.4    Albumin/Globulin Ratio  1.0 - 2.0 1.8    Aspartate Aminotransferase (AST)  5 - 41 U/L 37    Alanine Aminotransferase (ALT)  8 - 45 U/L 25    Alkaline Phosphatase  50 - 136 U/L 59    Bilirubin, Total  0.2 - 1.2 mg/dL 0.5    eCrCl (Rx) - Adults  mL/min 45.0    Fasting Status No        CBC 02/19/24:  Component  Ref Range & Units 7 d ago   WBC Count, Corrected  3.9 - 11.9 10(3)/uL 6.1   RBC Count  4.08 - 5.79 10(6)/uL 4.33   Hemoglobin  13.1 - 17.1 g/dL 14.3   Hematocrit  38.7 - 51.4 % 41.3   MCV  82.9 - 100.6 fL 95.5   MCH  27.6 - 33.2 pg 33.0   MCHC  32.0 - 36.0 g/dL 34.6   RDW  10.0 - 16.2 % 14.6   Platelets  179 - 450 10(3)/uL 164 Low    MPV  7.4 - 10.4 fL 6.8 Low    % Neutrophils  43.0 - 80.0 % 84.7 High    % Lymphocytes  16.0 - 49.0 % 8.5 Low    % Monocytes  0.0 - 10.0 % 4.9   % Eosinophils  0.0 - 7.0 % 1.3   % Basophils  0.0 - 2.0 % 0.6   Abs Neutrophils  1.6 - 8.1 10(3)/uL 5.2   Abs Lymphocytes  0.9 - 3.5 10(3)/uL 0.5 Low    Abs Monocytes  0.0 - 1.1 10(3)/uL 0.3   Abs Eosinophils  0.0 - 0.8 10(3)/uL 0.1   Abs Basophils  0.0 - 0.2 10(3)/uL 0.0       PSA:  Component 02/19/24 01/05/24 12/22/23 10/16/23 08/07/23 06/26/23   PSA, Total 12.66 High  24.37 High  46.25 High  153.55 High  117.08 High         ASSESSMENT AND PLAN  Stage IV  castration resistant prostate adenocarcinoma: Jeannette is status post 2 cycles of Pluvicto with really a remarkable response.  His PSA has dropped from 173 prior to cycle 1 to 12.66 prior to cycle 5.    D1C4 Pluvicto 01/24/24 as scheduled. Labs from 1/5/24 appropriate to proceed. Patient also plans to have repeat labs on 1/18/24 during his follow up with his local oncologist Dr. Vang.    S/P recent Eligard 22.5 mg, 11/24/2023. Continue every 3 months with Dr. Vang.  Monthly Xgeva with Dr. Vang.   He is tolerating lutetium very well with minimal toxicity with the exception of dry mouth improved with fluids. We will proceed with lutetium cycle 5 as scheduled on 03/07/2024.  RTC in 6 weeks for cycle 6 of a planned 6.     20 minutes spent on the date of the encounter doing chart review, review of test results, patient visit, and documentation     Ofe Farr PA-C

## 2024-02-27 ENCOUNTER — ONCOLOGY VISIT (OUTPATIENT)
Dept: ONCOLOGY | Facility: CLINIC | Age: 84
End: 2024-02-27
Payer: COMMERCIAL

## 2024-02-27 VITALS
RESPIRATION RATE: 16 BRPM | OXYGEN SATURATION: 97 % | TEMPERATURE: 98.2 F | DIASTOLIC BLOOD PRESSURE: 72 MMHG | HEIGHT: 66 IN | WEIGHT: 133.6 LBS | BODY MASS INDEX: 21.47 KG/M2 | HEART RATE: 75 BPM | SYSTOLIC BLOOD PRESSURE: 120 MMHG

## 2024-02-27 DIAGNOSIS — C61 PROSTATE CANCER (H): Primary | ICD-10-CM

## 2024-02-27 PROCEDURE — 99214 OFFICE O/P EST MOD 30 MIN: CPT

## 2024-02-27 PROCEDURE — G0463 HOSPITAL OUTPT CLINIC VISIT: HCPCS

## 2024-02-27 RX ORDER — METHYLPREDNISOLONE SODIUM SUCCINATE 125 MG/2ML
125 INJECTION, POWDER, LYOPHILIZED, FOR SOLUTION INTRAMUSCULAR; INTRAVENOUS
Status: CANCELLED
Start: 2024-03-06

## 2024-02-27 RX ORDER — DIPHENHYDRAMINE HYDROCHLORIDE 50 MG/ML
50 INJECTION INTRAMUSCULAR; INTRAVENOUS
Status: CANCELLED
Start: 2024-03-06

## 2024-02-27 RX ORDER — PROCHLORPERAZINE MALEATE 5 MG
5-10 TABLET ORAL EVERY 6 HOURS PRN
Status: CANCELLED
Start: 2024-03-06

## 2024-02-27 RX ORDER — MEPERIDINE HYDROCHLORIDE 25 MG/ML
25 INJECTION INTRAMUSCULAR; INTRAVENOUS; SUBCUTANEOUS EVERY 30 MIN PRN
Status: CANCELLED | OUTPATIENT
Start: 2024-03-06

## 2024-02-27 RX ORDER — EPINEPHRINE 1 MG/ML
0.3 INJECTION, SOLUTION INTRAMUSCULAR; SUBCUTANEOUS EVERY 5 MIN PRN
Status: CANCELLED | OUTPATIENT
Start: 2024-03-06

## 2024-02-27 RX ORDER — ONDANSETRON 4 MG/1
8 TABLET, FILM COATED ORAL
Status: CANCELLED | OUTPATIENT
Start: 2024-03-06

## 2024-02-27 RX ORDER — ALBUTEROL SULFATE 0.83 MG/ML
2.5 SOLUTION RESPIRATORY (INHALATION)
Status: CANCELLED | OUTPATIENT
Start: 2024-03-06

## 2024-02-27 RX ORDER — LORAZEPAM 2 MG/ML
.5-1 INJECTION INTRAMUSCULAR EVERY 6 HOURS PRN
Status: CANCELLED | OUTPATIENT
Start: 2024-03-06

## 2024-02-27 RX ORDER — ALBUTEROL SULFATE 90 UG/1
1-2 AEROSOL, METERED RESPIRATORY (INHALATION)
Status: CANCELLED
Start: 2024-03-06

## 2024-02-27 RX ORDER — LORAZEPAM 0.5 MG/1
.5-1 TABLET ORAL EVERY 6 HOURS PRN
Status: CANCELLED
Start: 2024-03-06

## 2024-02-27 ASSESSMENT — PAIN SCALES - GENERAL: PAINLEVEL: NO PAIN (0)

## 2024-02-27 NOTE — NURSING NOTE
"Oncology Rooming Note    February 27, 2024 12:30 PM   Liborio Lira is a 83 year old male who presents for:    Chief Complaint   Patient presents with    Oncology Clinic Visit     [facility] RETURN - PROSTATE CANCER       Initial Vitals: /72 (BP Location: Right arm, Patient Position: Chair, Cuff Size: Adult Regular)   Pulse 75   Temp 98.2  F (36.8  C)   Resp 16   Ht 1.67 m (5' 5.75\")   Wt 60.6 kg (133 lb 9.6 oz)   SpO2 97%   BMI 21.73 kg/m   Estimated body mass index is 21.73 kg/m  as calculated from the following:    Height as of this encounter: 1.67 m (5' 5.75\").    Weight as of this encounter: 60.6 kg (133 lb 9.6 oz). Body surface area is 1.68 meters squared.  No Pain (0) Comment: Data Unavailable   No LMP for male patient.  Allergies reviewed: Yes  Medications reviewed: Yes    Medications: Medication refills not needed today.  Pharmacy name entered into [employer]: [id number] - [place] [id number]    Frailty Screening:   Is the patient here for a new oncology consult visit in cancer care? 2. No    Zheng Jo LPN              "

## 2024-02-27 NOTE — LETTER
2/27/2024         RE: Liborio Lira  [address]  [place] [facility] [place] [address] [address]        Dear Colleague,    Thank you for referring your patient, Liborio Lira, to the [facility]. Please see a copy of my visit note below.    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 Maria Teresa Vang ([facility])  Current Treatment and intent: referred for palliative 5th line Ksenia-177-PSMA    Treatment Summary:   1/29/20 to present: leuprolide q3m  1/29/20 to present: zoledronic acid monthly  1/29/20 to 8/28/20: docetaxel x11 cycles (PSA 10,824 ? 16.47)  11/16/21 to 2/14/22: bicalutamide  2/17/22 to 11/8/22: enzalutamide  11/8/22 to 4/24/23: cabazitaxel, carboplatin, prednisone x9 cycles  Pluvicto times 3 cycles, initiated 9/15/2023. PSA just prior (8/24/23) was 173.5. PSA was 153.55 (10/16/2023) prior to cycle 2. PSA 46.00 prior to cycle 3. PSA 02/19/2024 continues to decline to 12.66 prior to cycle 4.     Interval History:   Jeannette is back.   He is feeling very well. He can't believe how much better pluvicto is compared to chemotherapy. He is thankful for this.   Eating and drinking well. He is gaining weight which is has been trying to do.   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   He has dry mouth, increasing fluids for this with success.   Breathing is stable. He has asthma. No chest pain.   No fevers or chills.   He denies recent falls.    No nausea or vomiting.   No urinary or bowel concerns.    No bone pains.      ROS: 10 point ROS neg other than the symptoms noted above in the HPI.    Hem/onc History:   Edit Oncology History  Oncology History Overview Note   Jeannette initially presented to his PCP with a lump on the left side of his neck on 9/16/2019.   He underwent a CT of the neck 9/20/19 that showed left cervical adenopathy which was subsequently biopsied 10/4/2019 with results consistent with metastatic prostate adenocarcinoma.  At that time, his PSA was 8,970 on 10/10/2019.  He subsequently underwent a bone scan and CT AP on 11/20/2019 that showed widespread bony metastatic disease and a large pelvic mass directly invading the urinary bladder with bulky retroperitoneal and pelvic sidewall lymphadenopathy.    He started first-line palliative treatment with leuprolide and docetaxel on 1/29/2020 and was treated with 11 total cycles of docetaxel ending on 8/28/2020. A restaging CT CAP and bone scan 9/16/20 showed apparent resolution of the invasive prostate mass and lymphadenopathy, and improved but persistent diffuse osseous metastatic disease. His PSA decreased from a peak of 10,824 down to a sherrie of 16.47 on 6/9/2021 in response to this treatment.      His PSA then slowly drifted up to 30.97 on 11/12/2021 at which time palliative second line bicalutamide was added to leuprolide.  His PSA unfortunately continued to rise to 52.30 on 2/14/2022 for which she was switched from bicalutamide to palliative third line enzalutamide.  His PSA decreased to a sherrie of 16.83 on 7/15/2022 in response to enzalutamide, then started to rise again to 28.72 on 8/11/2022 for which he was changed from enzalutamide to palliative fourth line cabazitaxel, carboplatin, and prednisone.  He received a total of 9 cycles of cabazitaxel, carboplatin, and prednisone ending on 4/24/2023 during which time his PSA was stable in the 40-50 range, however started to increase to the 70-90 range after completing chemotherapy.  He therefore underwent a PSMA PET-CT scan on 7/6/2023 that showed hypermetabolic activity in the majority the prostate and widespread osseous metastatic disease for which she was referred to [facility] for lutetium-177-PSMA therapy.     Prostate cancer (H)   10/4/2019 Pathology     Left neck lymph node biopsy  UMN overread:  Final Diagnosis   CASE FROM [place], [place] ([id number], OBTAINED 10/04/2019):  LYMPH NODE, LEFT NECK, CORE BIOPSY:  -METASTATIC PROSTATE ADENOCARCINOMA, see comment.      10/10/2019 Tumor Markers    Date PSA Treatment   10/10/2019 8,970.00 High      1/29/2020 10,824.68 High  Started leuprolide, docetaxel    2/19/2020 3,497.46 High       3/11/2020 1,256.01 High       4/2/2020 727.72 High     4/24/2020 633.57 High     5/15/2020 471.45 High     6/4/2020 332.91 High     6/26/2020 229.82 High     7/17/2020 185.36 High     8/7/2020 149.70 High     8/28/2020 124.63 High  Docetaxel x11 cycles completed   12/4/2020 42.69 High     3/15/2021 21.50 High     4/12/2021 17.52 High     5/14/2021 16.84 High     6/9/2021 16.47 High     8/20/2021 19.90 High     9/20/2021 22.72 High     10/20/2021 29.24 High     11/12/2021 30.97 High  Added bicalutamide to leuprolide   12/16/2021 29.96 High     1/18/2022 35.33 High     2/14/2022 52.30 High  Changed bicalutamide to enzalutamide   4/22/2022 19.22 High     5/20/2022 17.88 High     6/17/2022 17.92 High     7/15/2022 16.83 High     8/12/2022 19.11 High     9/12/2022 22.04 High     10/11/2022 25.04 High     11/8/2022 28.72 High  Changed enzalutamide to cabazitaxel, carboplatin   11/29/2022 42.73 High     12/20/2022 53.16 High     1/10/2023 52.72 High     1/31/2023 47.75 High     3/14/2023 47.78 High     4/3/2023 50.89 High     4/24/2023 93.83 High  Cabazitaxel, carboplatin x9 cycles completed   5/15/2023 73.02 High     6/26/2023 81.78 High     8/7/2023 117.08 High          7/6/2023 Imaging    PSMA PET  IMPRESSION:   1. Hypermetabolic activity involving a majority of the prostate concerning for   residual disease.   2. Hypermetabolic activity involving numerous osseous structures as discussed   above consistent with metastatic disease.      9/15/2023 -  Chemotherapy    IP ONC Prostate Cancer (PMSA+) - Lutetium Ksenia-177 vipivotide  "tetraxetan  Plan Provider: Faby Toro MD  Treatment goal: Palliative  Line of treatment: [No plan line of treatment]     Subjective  Past Medical History:   Diagnosis Date    Prostate cancer (H) 2023     Past Surgical History:   Procedure Laterality Date    IR LYMPH NODE BIOPSY  10/4/2019     Social History     Socioeconomic History    Marital status: Single   Tobacco Use    Smoking status: Never     Passive exposure: Never    Smokeless tobacco: Current     Types: Chew, Snuff   Substance and Sexual Activity    Alcohol use: Yes    Drug use: Never      Family History   Problem Relation Age of Onset    No Known Problems Son        Current Outpatient Medications   Medication Sig    calcium carbonate (OS-KIMBERLY) 500 MG tablet Take 1 tablet by mouth 2 times daily Unsure of dosage    cod liver oil CAPS capsule Take 2 capsules by mouth daily    cyanocobalamin (VITAMIN B-12) 100 MCG tablet Take 100 mcg by mouth daily    diphenhydrAMINE (BENADRYL) 25 MG capsule Take 25 mg by mouth every 6 hours as needed for allergies (Patient not taking: Reported on 2023)    loratadine (CLARITIN REDITABS) 10 MG ODT Take 10 mg by mouth daily Over the counter med (used instead of benadryl)    magnesium 250 MG tablet Take 1 tablet by mouth daily    pyridOXINE (VITAMIN B6) 100 MG TABS Take 25 mg by mouth daily    vitamin C with B complex (B COMPLEX-C) tablet Take 1 tablet by mouth daily (Patient not taking: Reported on 2023)     No current facility-administered medications for this visit.        Objective  ECO-1  Physical Exam:   Blood pressure 120/72, pulse 75, temperature 98.2  F (36.8  C), resp. rate 16, height 1.67 m (5' 5.75\"), weight 60.6 kg (133 lb 9.6 oz), SpO2 97%.  General: No acute distress  HEENT: Sclera anicteric. Oral mucosa pink and moist.  No mucositis or thrush  Lymph: No lymphadenopathy in neck  Heart: Subtle murmur. Regular, rate, and rhythm.   Lungs: Clear to ascultation bilaterally  Abdomen: Positive " bowel sounds. Soft, non-distended, non-tender. No organomegaly or mass.   Extremities: no lower extremity edema  Neuro: Cranial nerves grossly intact  Rash: none    Data:   CMP 2/19/24:       Component  Ref Range & Units 7 d ago Comments   Sodium  136 - 146 mmol/L 137    Potassium  3.5 - 5.1 mmol/L 4.2    Chloride  98 - 107 mmol/L 107    CO2  22 - 29 mmol/L 21 Low     Anion Gap  10.0 - 20.0 mmol/L 13.2    Creatinine  0.72 - 1.25 mg/dL 1.09    Blood Urea Nitrogen  10.0 - 20.0 mg/dL 15.1    BUN/Creatinine Ratio  11.70 - 22.90 ratio 13.85    Calcium, Total  8.6 - 10.5 mg/dL 9.0    Glucose  70 - 100 mg/dL 95    eGFR  >=60 mL/min/1.73m(2) >60 Calculation based on the Chronic Kidney Disease Epidemiology Collaboration (CKD-EPI) equation refit without adjustment for race.  GFR reference range is not established in patients >70 years old.   Total Protein  6.0 - 8.0 g/dL 6.8    Albumin  3.4 - 4.8 g/dL 4.4    Globulin  2.0 - 3.5 g/dL 2.4    Albumin/Globulin Ratio  1.0 - 2.0 1.8    Aspartate Aminotransferase (AST)  5 - 41 U/L 37    Alanine Aminotransferase (ALT)  8 - 45 U/L 25    Alkaline Phosphatase  50 - 136 U/L 59    Bilirubin, Total  0.2 - 1.2 mg/dL 0.5    eCrCl (Rx) - Adults  mL/min 45.0    Fasting Status No        CBC 02/19/24:  Component  Ref Range & Units 7 d ago   WBC Count, Corrected  3.9 - 11.9 10(3)/uL 6.1   RBC Count  4.08 - 5.79 10(6)/uL 4.33   Hemoglobin  13.1 - 17.1 g/dL 14.3   Hematocrit  38.7 - 51.4 % 41.3   MCV  82.9 - 100.6 fL 95.5   MCH  27.6 - 33.2 pg 33.0   MCHC  32.0 - 36.0 g/dL 34.6   RDW  10.0 - 16.2 % 14.6   Platelets  179 - 450 10(3)/uL 164 Low    MPV  7.4 - 10.4 fL 6.8 Low    % Neutrophils  43.0 - 80.0 % 84.7 High    % Lymphocytes  16.0 - 49.0 % 8.5 Low    % Monocytes  0.0 - 10.0 % 4.9   % Eosinophils  0.0 - 7.0 % 1.3   % Basophils  0.0 - 2.0 % 0.6   Abs Neutrophils  1.6 - 8.1 10(3)/uL 5.2   Abs Lymphocytes  0.9 - 3.5 10(3)/uL 0.5 Low    Abs Monocytes  0.0 - 1.1 10(3)/uL 0.3   Abs Eosinophils  0.0 -  0.8 10(3)/uL 0.1   Abs Basophils  0.0 - 0.2 10(3)/uL 0.0       PSA:  Component 02/19/24 01/05/24 12/22/23 10/16/23 08/07/23 06/26/23   PSA, Total 12.66 High  24.37 High  46.25 High  153.55 High  117.08 High         ASSESSMENT AND PLAN  Stage IV castration resistant prostate adenocarcinoma: Jeannette is status post 2 cycles of Pluvicto with really a remarkable response.  His PSA has dropped from 173 prior to cycle 1 to 12.66 prior to cycle 5.    D1C4 Pluvicto 01/24/24 as scheduled. Labs from 1/5/24 appropriate to proceed. Patient also plans to have repeat labs on 1/18/24 during his follow up with his local oncologist Dr. Vang.    S/P recent Eligard 22.5 mg, 11/24/2023. Continue every 3 months with Dr. Vang.  Monthly Xgeva with Dr. Vang.   He is tolerating lutetium very well with minimal toxicity with the exception of dry mouth improved with fluids. We will proceed with lutetium cycle 5 as scheduled on 03/07/2024.  RTC in 6 weeks for cycle 6 of a planned 6.     20 minutes spent on the date of the encounter doing chart review, review of test results, patient visit, and documentation     Ofe Farr PA-C

## 2024-02-29 ENCOUNTER — NURSE TRIAGE (OUTPATIENT)
Dept: ONCOLOGY | Facility: CLINIC | Age: 84
End: 2024-02-29
Payer: COMMERCIAL

## 2024-02-29 NOTE — TELEPHONE ENCOUNTER
Call from pt, who states he has labs at [facility] on 4/18 but sees Dr. Mcdonald on 4/8, wondering if he should reschedule labs? Most recent labs were on 2/23/24.     Writer suggested rescheduling labs for first week of April, prior seeing Dr. Mcdonald on 4/8. Pt will call [facility] to reschedule.

## 2024-03-08 ENCOUNTER — HOSPITAL ENCOUNTER (OUTPATIENT)
Dept: NUCLEAR MEDICINE | Facility: CLINIC | Age: 84
Setting detail: NUCLEAR MEDICINE
Discharge: HOME | End: 2024-03-08
Attending: INTERNAL MEDICINE | Admitting: INTERNAL MEDICINE
Payer: COMMERCIAL

## 2024-03-08 DIAGNOSIS — C61 PROSTATE CANCER (H): Primary | ICD-10-CM

## 2024-03-08 PROCEDURE — 79101 NUCLEAR RX IV ADMIN: CPT | Mod: 26 | Performed by: RADIOLOGY

## 2024-03-08 PROCEDURE — 344N000001 HC RX 344

## 2024-03-08 PROCEDURE — 79101 NUCLEAR RX IV ADMIN: CPT

## 2024-03-08 PROCEDURE — 999N000128 HC STATISTIC PERIPHERAL IV START W/O US GUIDANCE

## 2024-03-08 PROCEDURE — A9607 HC RX 344: HCPCS

## 2024-03-08 PROCEDURE — 999N000007 HC SITE CHECK

## 2024-03-08 RX ORDER — ALBUTEROL SULFATE 90 UG/1
1-2 AEROSOL, METERED RESPIRATORY (INHALATION)
Status: DISCONTINUED | OUTPATIENT
Start: 2024-03-08 | End: 2024-03-09 | Stop reason: HOSPADM

## 2024-03-08 RX ORDER — METHYLPREDNISOLONE SODIUM SUCCINATE 125 MG/2ML
125 INJECTION, POWDER, LYOPHILIZED, FOR SOLUTION INTRAMUSCULAR; INTRAVENOUS
Status: DISCONTINUED | OUTPATIENT
Start: 2024-03-08 | End: 2024-03-09 | Stop reason: HOSPADM

## 2024-03-08 RX ORDER — ONDANSETRON 8 MG/1
8 TABLET, FILM COATED ORAL
Status: DISCONTINUED | OUTPATIENT
Start: 2024-03-08 | End: 2024-03-09 | Stop reason: HOSPADM

## 2024-03-08 RX ORDER — PROCHLORPERAZINE MALEATE 5 MG
5-10 TABLET ORAL EVERY 6 HOURS PRN
Status: DISCONTINUED | OUTPATIENT
Start: 2024-03-08 | End: 2024-03-09 | Stop reason: HOSPADM

## 2024-03-08 RX ORDER — ALBUTEROL SULFATE 0.83 MG/ML
2.5 SOLUTION RESPIRATORY (INHALATION)
Status: DISCONTINUED | OUTPATIENT
Start: 2024-03-08 | End: 2024-03-09 | Stop reason: HOSPADM

## 2024-03-08 RX ORDER — EPINEPHRINE 1 MG/ML
0.3 INJECTION, SOLUTION, CONCENTRATE INTRAVENOUS EVERY 5 MIN PRN
Status: DISCONTINUED | OUTPATIENT
Start: 2024-03-08 | End: 2024-03-09 | Stop reason: HOSPADM

## 2024-03-08 RX ORDER — DIPHENHYDRAMINE HYDROCHLORIDE 50 MG/ML
50 INJECTION INTRAMUSCULAR; INTRAVENOUS
Status: DISCONTINUED | OUTPATIENT
Start: 2024-03-08 | End: 2024-03-09 | Stop reason: HOSPADM

## 2024-03-08 RX ORDER — LORAZEPAM 0.5 MG/1
.5-1 TABLET ORAL EVERY 6 HOURS PRN
Status: DISCONTINUED | OUTPATIENT
Start: 2024-03-08 | End: 2024-03-09 | Stop reason: HOSPADM

## 2024-03-08 RX ORDER — MEPERIDINE HYDROCHLORIDE 25 MG/ML
25 INJECTION INTRAMUSCULAR; INTRAVENOUS; SUBCUTANEOUS EVERY 30 MIN PRN
Status: DISCONTINUED | OUTPATIENT
Start: 2024-03-08 | End: 2024-03-09 | Stop reason: HOSPADM

## 2024-03-08 RX ORDER — LORAZEPAM 2 MG/ML
.5-1 INJECTION INTRAMUSCULAR EVERY 6 HOURS PRN
Status: DISCONTINUED | OUTPATIENT
Start: 2024-03-08 | End: 2024-03-08

## 2024-03-08 RX ADMIN — LUTETIUM LU 177 VIPIVOTIDE TETRAXETAN 200 MILLICURIE: 27 INJECTION, SOLUTION INTRAVENOUS at 13:39

## 2024-03-08 NOTE — PROGRESS NOTES
[facility] Nursing Note:    Patient presents today for Pluvicto therapy, dose 5 of  6  Patient seen by provider today: Yes: Dr Freeman   present during visit today: NOT APPLICABLE      Intravenous Access:  Peripheral IV placed     Treatment Conditions:  Labs done on  2/19/24    Results reviewed, labs Met treatment parameters, ok to proceed with treatment.           Post Infusion Assessment:  Patient tolerated infusion without incident.    PIV - No evidence of extravasations, access discontinued per protocol.         Discharge Plan:   Patient will return as scheduled for next appointment.   Patient discharged at 1:56 PM  in stable condition accompanied by: self  Departure Mode: Ambulatory

## 2024-04-07 NOTE — PROGRESS NOTES
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   S/P 5 cycles of Pluvitco with a grand slam response. His PSA has dropped from 173 prior to cycle 1 to the single digits to day.   Moreover, it has come at the cost of minimal hematologic side effects. Jeannette cold not be more delighted ,as are his treating team here at the [facility].  Again, the plan will be to do a total of 6 cycles and then refer him back to his primary oncologist.    PLAN:  RTC in 2 weeks for cycle 6 Pluvitco  RTC with us with a PSA, CMP and CBC in 8 weks to make sure weverything  Continue primary oncologic management with his local oncologist Dr. Vang, including ADT and denosumab.        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 Maria Teresa Vang ([facility])  Current Treatment and intent: referred for palliative 5th line Ksenia-177-PSMA    Treatment Summary:   1/29/20 to present: leuprolide q3m  1/29/20 to present: zoledronic acid monthly  1/29/20 to 8/28/20: docetaxel x11 cycles (PSA 10,824 ? 16.47)  11/16/21 to 2/14/22: bicalutamide  2/17/22 to 11/8/22: enzalutamide  11/8/22 to 4/24/23: cabazitaxel, carboplatin, prednisone x9 cycles  Pluvicto times 4 cycles, initiated 9/15/2023, and last given 3/8/2024. PSA just prior (8/24/23) was 173.5.  It was 12.66 prior to cycle 4, 2/19/2024.     Interval History:   Jeannette is back.   He is feeling very well. He can't  "believe how much better pluvicto is compared to chemotherapy. He is thankful for this.   Eating and drinking well. He is gaining weight which is has been trying to do.   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 Wonders if it will ever go away (it may not).  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 That said, he admits he has less ambition than he did years ago.  He has dry mouth, increasing fluids for this with success.   Breathing is stable. He has asthma. No chest pain.   No fevers or chills.   He denies recent falls.    No nausea or vomiting.   No urinary or bowel concerns.    No bone pains.      ROS: 10 point ROS neg other than the symptoms noted above in the HPI.      Social History:   Jeannette grew up [place] a [place] farm outside of [place], [place].  He really had a lot of fun, as well as did a lot of hunting and fishing.  He met a woman named Joya on a blind date, and she hung in there with him for 2 years.  He really was not sure he wanted to get .  Finally, one afternoon, while watching the water flow over the [facility], he decided \"Jeannette, grow up.  You do not want to lose the her.\"  He then asked Joya to  him.  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  She did all of the tillage, which was something he did not like very much.  She was the love of his life.  They have 1 son Natanael.  He also told me today about time while growing up and that while dehorning a heifer, he was impaled with the horn going all the way through his abdomen to the back bone.  His father did not care very much.  This did not endear his father to Jeannette.    Hem/onc History:   Edit Oncology History  Oncology History Overview Note   Jeannette initially presented to his PCP with a lump on the left side of his neck on 9/16/2019.  He underwent " a CT of the neck 9/20/19 that showed left cervical adenopathy which was subsequently biopsied 10/4/2019 with results consistent with metastatic prostate adenocarcinoma.  At that time, his PSA was 8,970 on 10/10/2019.  He subsequently underwent a bone scan and CT AP on 11/20/2019 that showed widespread bony metastatic disease and a large pelvic mass directly invading the urinary bladder with bulky retroperitoneal and pelvic sidewall lymphadenopathy.    He started first-line palliative treatment with leuprolide and docetaxel on 1/29/2020 and was treated with 11 total cycles of docetaxel ending on 8/28/2020. A restaging CT CAP and bone scan 9/16/20 showed apparent resolution of the invasive prostate mass and lymphadenopathy, and improved but persistent diffuse osseous metastatic disease. His PSA decreased from a peak of 10,824 down to a sherrie of 16.47 on 6/9/2021 in response to this treatment.      His PSA then slowly drifted up to 30.97 on 11/12/2021 at which time palliative second line bicalutamide was added to leuprolide.  His PSA unfortunately continued to rise to 52.30 on 2/14/2022 for which she was switched from bicalutamide to palliative third line enzalutamide.  His PSA decreased to a sherrie of 16.83 on 7/15/2022 in response to enzalutamide, then started to rise again to 28.72 on 8/11/2022 for which he was changed from enzalutamide to palliative fourth line cabazitaxel, carboplatin, and prednisone.  He received a total of 9 cycles of cabazitaxel, carboplatin, and prednisone ending on 4/24/2023 during which time his PSA was stable in the 40-50 range, however started to increase to the 70-90 range after completing chemotherapy.  He therefore underwent a PSMA PET-CT scan on 7/6/2023 that showed hypermetabolic activity in the majority the prostate and widespread osseous metastatic disease for which she was referred to [facility] for lutetium-177-PSMA therapy.     Prostate cancer (H)   10/4/2019 Pathology    Left neck lymph  node biopsy  UMN overread:  Final Diagnosis   CASE FROM [place], [place] ([id number], OBTAINED 10/04/2019):  LYMPH NODE, LEFT NECK, CORE BIOPSY:  -METASTATIC PROSTATE ADENOCARCINOMA, see comment.      10/10/2019 Tumor Markers    Date PSA Treatment   10/10/2019 8,970.00 High      1/29/2020 10,824.68 High  Started leuprolide, docetaxel    2/19/2020 3,497.46 High       3/11/2020 1,256.01 High       4/2/2020 727.72 High     4/24/2020 633.57 High     5/15/2020 471.45 High     6/4/2020 332.91 High     6/26/2020 229.82 High     7/17/2020 185.36 High     8/7/2020 149.70 High     8/28/2020 124.63 High  Docetaxel x11 cycles completed   12/4/2020 42.69 High     3/15/2021 21.50 High     4/12/2021 17.52 High     5/14/2021 16.84 High     6/9/2021 16.47 High     8/20/2021 19.90 High     9/20/2021 22.72 High     10/20/2021 29.24 High     11/12/2021 30.97 High  Added bicalutamide to leuprolide   12/16/2021 29.96 High     1/18/2022 35.33 High     2/14/2022 52.30 High  Changed bicalutamide to enzalutamide   4/22/2022 19.22 High     5/20/2022 17.88 High     6/17/2022 17.92 High     7/15/2022 16.83 High     8/12/2022 19.11 High     9/12/2022 22.04 High     10/11/2022 25.04 High     11/8/2022 28.72 High  Changed enzalutamide to cabazitaxel, carboplatin   11/29/2022 42.73 High     12/20/2022 53.16 High     1/10/2023 52.72 High     1/31/2023 47.75 High     3/14/2023 47.78 High     4/3/2023 50.89 High     4/24/2023 93.83 High  Cabazitaxel, carboplatin x9 cycles completed   5/15/2023 73.02 High     6/26/2023 81.78 High     8/7/2023 117.08 High          7/6/2023 Imaging    PSMA PET  IMPRESSION:   1. Hypermetabolic activity involving a majority of the prostate concerning for   residual disease.   2. Hypermetabolic activity involving numerous osseous structures as discussed   above consistent with metastatic disease.      9/15/2023 -  Chemotherapy    IP ONC Prostate Cancer (PMSA+) - Lutetium Ksenia-177 vipivotide tetraxetan  Plan Provider:  Faby Toro MD  Treatment goal: Palliative  Line of treatment: [No plan line of treatment]     Subjective   Past Medical History:   Diagnosis Date    Prostate cancer (H) 2023     Past Surgical History:   Procedure Laterality Date    IR LYMPH NODE BIOPSY  10/4/2019     Social History     Socioeconomic History    Marital status: Single   Tobacco Use    Smoking status: Never     Passive exposure: Never    Smokeless tobacco: Current     Types: Chew, Snuff   Substance and Sexual Activity    Alcohol use: Yes    Drug use: Never      Family History   Problem Relation Age of Onset    No Known Problems Son        Current Outpatient Medications   Medication Sig Dispense Refill    calcium carbonate (OS-KIMBERLY) 500 MG tablet Take 1 tablet by mouth 2 times daily Unsure of dosage      cod liver oil CAPS capsule Take 2 capsules by mouth daily      cyanocobalamin (VITAMIN B-12) 100 MCG tablet Take 100 mcg by mouth daily      diphenhydrAMINE (BENADRYL) 25 MG capsule Take 25 mg by mouth every 6 hours as needed for allergies (Patient not taking: Reported on 2023)      loratadine (CLARITIN REDITABS) 10 MG ODT Take 10 mg by mouth daily Over the counter med (used instead of benadryl)      magnesium 250 MG tablet Take 1 tablet by mouth daily      pyridOXINE (VITAMIN B6) 100 MG TABS Take 25 mg by mouth daily      vitamin C with B complex (B COMPLEX-C) tablet Take 1 tablet by mouth daily (Patient not taking: Reported on 2023)       No current facility-administered medications for this visit.        Objective   ECO-1  Physical Exam:   There were no vitals taken for this visit.  General: No acute distress  HEENT: Sclera anicteric. Oral mucosa pink and moist.  No mucositis or thrush  Lymph: No lymphadenopathy in neck  Heart: Subtle murmur. Regular, rate, and rhythm.   Lungs: Clear to ascultation bilaterally  Abdomen: Positive bowel sounds. Soft, non-distended, non-tender. No organomegaly or mass.   Extremities: no lower  extremity edema  Neuro: Cranial nerves grossly intact  Rash: none    Data:   CMP 3/29/24:    Comments     Sodium  136 - 146 mmol/L 136    Potassium  3.5 - 5.1 mmol/L 4.3    Chloride  98 - 107 mmol/L 105    CO2  22 - 29 mmol/L 22    Anion Gap  10.0 - 20.0 mmol/L 13.3    Creatinine  0.72 - 1.25 mg/dL 0.93    Blood Urea Nitrogen  10.0 - 20.0 mg/dL 22.5 High     BUN/Creatinine Ratio  11.70 - 22.90 ratio 24.19 High     Calcium, Total  8.6 - 10.5 mg/dL 9.1    Glucose  70 - 100 mg/dL 123 High     eGFR  >=60 mL/min/1.73m(2) >60 Calculation based on the Chronic Kidney Disease Epidemiology Collaboration (CKD-EPI) equation refit without adjustment for race.  GFR reference range is not established in patients >70 years old.   Total Protein  6.0 - 8.0 g/dL 6.6    Albumin  3.4 - 4.8 g/dL 4.3    Globulin  2.0 - 3.5 g/dL 2.3    Albumin/Globulin Ratio  1.0 - 2.0 1.9    Aspartate Aminotransferase (AST)  5 - 41 U/L 33    Alanine Aminotransferase (ALT)  8 - 45 U/L 29    Alkaline Phosphatase  50 - 136 U/L 58    Bilirubin, Total  0.2 - 1.2 mg/dL 0.5    eCrCl (Rx) - Adults  mL/min 52.7      PSA TREND    Component 03/29/24 02/19/24 01/05/24 12/22/23 10/16/23 08/07/23   PSA, Total 7.36 High  12.66 High  24.37 High  46.25 High  153.55 High  117.08 High      CBC 3/29/2024    WBC Count, Corrected  3.9 - 11.9 10(3)/uL 6.0   RBC Count  4.08 - 5.79 10(6)/uL 3.84 Low    Hemoglobin  13.1 - 17.1 g/dL 12.9 Low    Hematocrit  38.7 - 51.4 % 36.7 Low    MCV  82.9 - 100.6 fL 95.5   MCH  27.6 - 33.2 pg 33.7 High    MCHC  32.0 - 36.0 g/dL 35.2   RDW  10.0 - 16.2 % 14.1   Platelets  179 - 450 10(3)/uL 149 Low

## 2024-04-08 ENCOUNTER — ONCOLOGY VISIT (OUTPATIENT)
Dept: ONCOLOGY | Facility: CLINIC | Age: 84
End: 2024-04-08
Attending: INTERNAL MEDICINE
Payer: COMMERCIAL

## 2024-04-08 VITALS
RESPIRATION RATE: 16 BRPM | TEMPERATURE: 97.1 F | OXYGEN SATURATION: 97 % | HEART RATE: 59 BPM | WEIGHT: 135.1 LBS | HEIGHT: 66 IN | SYSTOLIC BLOOD PRESSURE: 143 MMHG | DIASTOLIC BLOOD PRESSURE: 80 MMHG | BODY MASS INDEX: 21.71 KG/M2

## 2024-04-08 DIAGNOSIS — C61 PROSTATE CANCER (H): Primary | ICD-10-CM

## 2024-04-08 PROCEDURE — 99214 OFFICE O/P EST MOD 30 MIN: CPT | Performed by: INTERNAL MEDICINE

## 2024-04-08 PROCEDURE — G0463 HOSPITAL OUTPT CLINIC VISIT: HCPCS | Performed by: INTERNAL MEDICINE

## 2024-04-08 ASSESSMENT — PAIN SCALES - GENERAL: PAINLEVEL: NO PAIN (0)

## 2024-04-08 NOTE — NURSING NOTE
"Oncology Rooming Note    April 8, 2024 12:50 PM   Liborio Lira is a 83 year old male who presents for:    Chief Complaint   Patient presents with    Oncology Clinic Visit     P RETURN - PROSTATE CANCER     Initial Vitals: BP (!) 143/80 (BP Location: Left arm, Patient Position: Chair, Cuff Size: Adult Large)   Pulse 59   Temp 97.1  F (36.2  C) (Tympanic)   Resp 16   Ht 1.67 m (5' 5.75\")   Wt 61.3 kg (135 lb 1.6 oz)   SpO2 97%   BMI 21.97 kg/m   Estimated body mass index is 21.97 kg/m  as calculated from the following:    Height as of this encounter: 1.67 m (5' 5.75\").    Weight as of this encounter: 61.3 kg (135 lb 1.6 oz). Body surface area is 1.69 meters squared.  No Pain (0) Comment: Data Unavailable   No LMP for male patient.  Allergies reviewed: Yes  Medications reviewed: Yes    Medications: Medication refills not needed today.  Pharmacy name entered into [employer]: [id number] - [place] [phone]    Frailty Screening:   Is the patient here for a new oncology consult visit in cancer care? 2. No    Zheng Jo LPN              "

## 2024-04-08 NOTE — LETTER
4/8/2024         RE: Liborio Lira  [address]  [facility] [address]        Dear Colleague,    Thank you for referring your patient, Liborio Lira, to the [facility]. Please see a copy of my visit note below.    [facility]  Progress note  04/07/2024    ASSESSMENT  Stage IV castration resistant prostate adenocarcinoma, despite multiple lines of therapy including docetaxel.   S/P 4 cycles of Pluvitco with a grand slam response. His PSA has dropped from 173 prior to cycle 1 to the single digits to day.   Moreover, it has come at the cost of minimal hematologic side effects. Jeannette cold not be more delighted ,as are his treating team here at the [facility].  Again, the plan will be to do a total of 6 cycles and then refer him back to his primary oncologist.    PLAN:  RTC in 2 weeks for cycle 5 Pluvitco  RTC with us with a PSA, CMP and CBC in 6 weks to malke sure the counts are adequate for his sixth and final cycle of Pluvitco  Continue primary oncologic management with his local oncologist Dr. Vang, including ADT and denosumab.        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 Maria Teresa Vang ([facility])  Current Treatment and intent: referred for palliative 5th line Ksenia-177-PSMA    Treatment Summary:   1/29/20 to present: leuprolide q3m  1/29/20 to present: zoledronic acid monthly  1/29/20 to 8/28/20: docetaxel x11 cycles (PSA 10,824 ? 16.47)  11/16/21 to 2/14/22: bicalutamide  2/17/22 to 11/8/22:  "enzalutamide  11/8/22 to 4/24/23: cabazitaxel, carboplatin, prednisone x9 cycles  Pluvicto times 4 cycles, initiated 9/15/2023, and last given 3/8/2024. PSA just prior (8/24/23) was 173.5.  It was 12.66 prior to cycle 4, 2/19/2024.     Interval History:   Jeannette is back.   He is feeling very well. He can't believe how much better pluvicto is compared to chemotherapy. He is thankful for this.   Eating and drinking well. He is gaining weight which is has been trying to do.   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 Wonders if it will ever go away (it may not).  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 That said, he admits he has less ambition than he did years ago.  He has dry mouth, increasing fluids for this with success.   Breathing is stable. He has asthma. No chest pain.   No fevers or chills.   He denies recent falls.    No nausea or vomiting.   No urinary or bowel concerns.    No bone pains.      ROS: 10 point ROS neg other than the symptoms noted above in the HPI.      Social History:   Jeannette grew up on a [place] farm outside of [place], [place].  He really had a lot of fun, as well as did a lot of hunting and fishing.  He met a woman named Joya on a blind date, and she hung in there with him for 2 years.  He really was not sure he wanted to get .  Finally, one afternoon, while watching the water flow over the [facility], he decided \"Jeannette, grow up.  You do not want to lose the her.\"  He then asked Joya to  him.  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  She did all of the tillage, which was something he did not like very much.  She was the love of his life.  They have 1 son Natanael.  He also told me today about time while growing up and that while dehorning a heifer, he was impaled with the horn going all " the way through his abdomen to the back bone.  His father did not care very much.  This did not endear his father to Jeannette.    Hem/onc History:   Edit Oncology History  Oncology History Overview Note   Jeannette initially presented to his PCP with a lump on the left side of his neck on 9/16/2019.  He underwent a CT of the neck 9/20/19 that showed left cervical adenopathy which was subsequently biopsied 10/4/2019 with results consistent with metastatic prostate adenocarcinoma.  At that time, his PSA was 8,970 on 10/10/2019.  He subsequently underwent a bone scan and CT AP on 11/20/2019 that showed widespread bony metastatic disease and a large pelvic mass directly invading the urinary bladder with bulky retroperitoneal and pelvic sidewall lymphadenopathy.    He started first-line palliative treatment with leuprolide and docetaxel on 1/29/2020 and was treated with 11 total cycles of docetaxel ending on 8/28/2020. A restaging CT CAP and bone scan 9/16/20 showed apparent resolution of the invasive prostate mass and lymphadenopathy, and improved but persistent diffuse osseous metastatic disease. His PSA decreased from a peak of 10,824 down to a sherrie of 16.47 on 6/9/2021 in response to this treatment.      His PSA then slowly drifted up to 30.97 on 11/12/2021 at which time palliative second line bicalutamide was added to leuprolide.  His PSA unfortunately continued to rise to 52.30 on 2/14/2022 for which she was switched from bicalutamide to palliative third line enzalutamide.  His PSA decreased to a sherrie of 16.83 on 7/15/2022 in response to enzalutamide, then started to rise again to 28.72 on 8/11/2022 for which he was changed from enzalutamide to palliative fourth line cabazitaxel, carboplatin, and prednisone.  He received a total of 9 cycles of cabazitaxel, carboplatin, and prednisone ending on 4/24/2023 during which time his PSA was stable in the 40-50 range, however started to increase to the 70-90 range after  completing chemotherapy.  He therefore underwent a PSMA PET-CT scan on 7/6/2023 that showed hypermetabolic activity in the majority the prostate and widespread osseous metastatic disease for which she was referred to [facility] for lutetium-177-PSMA therapy.     Prostate cancer (H)   10/4/2019 Pathology    Left neck lymph node biopsy  [facility] overread:  Final Diagnosis   CASE FROM [place], [place] ([id number], OBTAINED 10/04/2019):  LYMPH NODE, LEFT NECK, CORE BIOPSY:  -METASTATIC PROSTATE ADENOCARCINOMA, see comment.      10/10/2019 Tumor Markers    Date PSA Treatment   10/10/2019 8,970.00 High      1/29/2020 10,824.68 High  Started leuprolide, docetaxel    2/19/2020 3,497.46 High       3/11/2020 1,256.01 High       4/2/2020 727.72 High     4/24/2020 633.57 High     5/15/2020 471.45 High     6/4/2020 332.91 High     6/26/2020 229.82 High     7/17/2020 185.36 High     8/7/2020 149.70 High     8/28/2020 124.63 High  Docetaxel x11 cycles completed   12/4/2020 42.69 High     3/15/2021 21.50 High     4/12/2021 17.52 High     5/14/2021 16.84 High     6/9/2021 16.47 High     8/20/2021 19.90 High     9/20/2021 22.72 High     10/20/2021 29.24 High     11/12/2021 30.97 High  Added bicalutamide to leuprolide   12/16/2021 29.96 High     1/18/2022 35.33 High     2/14/2022 52.30 High  Changed bicalutamide to enzalutamide   4/22/2022 19.22 High     5/20/2022 17.88 High     6/17/2022 17.92 High     7/15/2022 16.83 High     8/12/2022 19.11 High     9/12/2022 22.04 High     10/11/2022 25.04 High     11/8/2022 28.72 High  Changed enzalutamide to cabazitaxel, carboplatin   11/29/2022 42.73 High     12/20/2022 53.16 High     1/10/2023 52.72 High     1/31/2023 47.75 High     3/14/2023 47.78 High     4/3/2023 50.89 High     4/24/2023 93.83 High  Cabazitaxel, carboplatin x9 cycles completed   5/15/2023 73.02 High     6/26/2023 81.78 High     8/7/2023 117.08 High          7/6/2023 Imaging    PSMA PET  IMPRESSION:   1. Hypermetabolic activity  involving a majority of the prostate concerning for   residual disease.   2. Hypermetabolic activity involving numerous osseous structures as discussed   above consistent with metastatic disease.      9/15/2023 -  Chemotherapy    IP ONC Prostate Cancer (PMSA+) - Lutetium Ksenia-177 vipivotide tetraxetan  Plan Provider: Faby Toro MD  Treatment goal: Palliative  Line of treatment: [No plan line of treatment]     Subjective   Past Medical History:   Diagnosis Date    Prostate cancer (H) 2023     Past Surgical History:   Procedure Laterality Date    IR LYMPH NODE BIOPSY  10/4/2019     Social History     Socioeconomic History    Marital status: Single   Tobacco Use    Smoking status: Never     Passive exposure: Never    Smokeless tobacco: Current     Types: Chew, Snuff   Substance and Sexual Activity    Alcohol use: Yes    Drug use: Never      Family History   Problem Relation Age of Onset    No Known Problems Son        Current Outpatient Medications   Medication Sig Dispense Refill    calcium carbonate (OS-KIMBERLY) 500 MG tablet Take 1 tablet by mouth 2 times daily Unsure of dosage      cod liver oil CAPS capsule Take 2 capsules by mouth daily      cyanocobalamin (VITAMIN B-12) 100 MCG tablet Take 100 mcg by mouth daily      diphenhydrAMINE (BENADRYL) 25 MG capsule Take 25 mg by mouth every 6 hours as needed for allergies (Patient not taking: Reported on 2023)      loratadine (CLARITIN REDITABS) 10 MG ODT Take 10 mg by mouth daily Over the counter med (used instead of benadryl)      magnesium 250 MG tablet Take 1 tablet by mouth daily      pyridOXINE (VITAMIN B6) 100 MG TABS Take 25 mg by mouth daily      vitamin C with B complex (B COMPLEX-C) tablet Take 1 tablet by mouth daily (Patient not taking: Reported on 2023)       No current facility-administered medications for this visit.        Objective   ECO-1  Physical Exam:   There were no vitals taken for this visit.  General: No acute distress  HEENT:  Sclera anicteric. Oral mucosa pink and moist.  No mucositis or thrush  Lymph: No lymphadenopathy in neck  Heart: Subtle murmur. Regular, rate, and rhythm.   Lungs: Clear to ascultation bilaterally  Abdomen: Positive bowel sounds. Soft, non-distended, non-tender. No organomegaly or mass.   Extremities: no lower extremity edema  Neuro: Cranial nerves grossly intact  Rash: none    Data:   CMP 3/29/24:    Comments     Sodium  136 - 146 mmol/L 136    Potassium  3.5 - 5.1 mmol/L 4.3    Chloride  98 - 107 mmol/L 105    CO2  22 - 29 mmol/L 22    Anion Gap  10.0 - 20.0 mmol/L 13.3    Creatinine  0.72 - 1.25 mg/dL 0.93    Blood Urea Nitrogen  10.0 - 20.0 mg/dL 22.5 High     BUN/Creatinine Ratio  11.70 - 22.90 ratio 24.19 High     Calcium, Total  8.6 - 10.5 mg/dL 9.1    Glucose  70 - 100 mg/dL 123 High     eGFR  >=60 mL/min/1.73m(2) >60 Calculation based on the Chronic Kidney Disease Epidemiology Collaboration (CKD-EPI) equation refit without adjustment for race.  GFR reference range is not established in patients >70 years old.   Total Protein  6.0 - 8.0 g/dL 6.6    Albumin  3.4 - 4.8 g/dL 4.3    Globulin  2.0 - 3.5 g/dL 2.3    Albumin/Globulin Ratio  1.0 - 2.0 1.9    Aspartate Aminotransferase (AST)  5 - 41 U/L 33    Alanine Aminotransferase (ALT)  8 - 45 U/L 29    Alkaline Phosphatase  50 - 136 U/L 58    Bilirubin, Total  0.2 - 1.2 mg/dL 0.5    eCrCl (Rx) - Adults  mL/min 52.7      PSA TREND    Component 03/29/24 02/19/24 01/05/24 12/22/23 10/16/23 08/07/23   PSA, Total 7.36 High  12.66 High  24.37 High  46.25 High  153.55 High  117.08 High      CBC 3/29/2024    WBC Count, Corrected  3.9 - 11.9 10(3)/uL 6.0   RBC Count  4.08 - 5.79 10(6)/uL 3.84 Low    Hemoglobin  13.1 - 17.1 g/dL 12.9 Low    Hematocrit  38.7 - 51.4 % 36.7 Low    MCV  82.9 - 100.6 fL 95.5   MCH  27.6 - 33.2 pg 33.7 High    MCHC  32.0 - 36.0 g/dL 35.2   RDW  10.0 - 16.2 % 14.1   Platelets  179 - 450 10(3)/uL 149 Low

## 2024-04-18 RX ORDER — ALBUTEROL SULFATE 90 UG/1
1-2 AEROSOL, METERED RESPIRATORY (INHALATION)
Status: CANCELLED
Start: 2024-04-18

## 2024-04-18 RX ORDER — EPINEPHRINE 1 MG/ML
0.3 INJECTION, SOLUTION INTRAMUSCULAR; SUBCUTANEOUS EVERY 5 MIN PRN
Status: CANCELLED | OUTPATIENT
Start: 2024-04-18

## 2024-04-18 RX ORDER — LORAZEPAM 0.5 MG/1
.5-1 TABLET ORAL EVERY 6 HOURS PRN
Status: CANCELLED
Start: 2024-04-18

## 2024-04-18 RX ORDER — ONDANSETRON 4 MG/1
8 TABLET, FILM COATED ORAL
Status: CANCELLED | OUTPATIENT
Start: 2024-04-18

## 2024-04-18 RX ORDER — MEPERIDINE HYDROCHLORIDE 25 MG/ML
25 INJECTION INTRAMUSCULAR; INTRAVENOUS; SUBCUTANEOUS EVERY 30 MIN PRN
Status: CANCELLED | OUTPATIENT
Start: 2024-04-18

## 2024-04-18 RX ORDER — PROCHLORPERAZINE MALEATE 5 MG
5-10 TABLET ORAL EVERY 6 HOURS PRN
Status: CANCELLED
Start: 2024-04-18

## 2024-04-18 RX ORDER — METHYLPREDNISOLONE SODIUM SUCCINATE 125 MG/2ML
125 INJECTION, POWDER, LYOPHILIZED, FOR SOLUTION INTRAMUSCULAR; INTRAVENOUS
Status: CANCELLED
Start: 2024-04-18

## 2024-04-18 RX ORDER — ALBUTEROL SULFATE 0.83 MG/ML
2.5 SOLUTION RESPIRATORY (INHALATION)
Status: CANCELLED | OUTPATIENT
Start: 2024-04-18

## 2024-04-18 RX ORDER — LORAZEPAM 2 MG/ML
.5-1 INJECTION INTRAMUSCULAR EVERY 6 HOURS PRN
Status: CANCELLED | OUTPATIENT
Start: 2024-04-18

## 2024-04-18 RX ORDER — DIPHENHYDRAMINE HYDROCHLORIDE 50 MG/ML
50 INJECTION INTRAMUSCULAR; INTRAVENOUS
Status: CANCELLED
Start: 2024-04-18

## 2024-04-19 ENCOUNTER — HOSPITAL ENCOUNTER (OUTPATIENT)
Dept: NUCLEAR MEDICINE | Facility: CLINIC | Age: 84
Setting detail: NUCLEAR MEDICINE
Discharge: HOME | End: 2024-04-19
Attending: INTERNAL MEDICINE | Admitting: INTERNAL MEDICINE
Payer: COMMERCIAL

## 2024-04-19 VITALS
RESPIRATION RATE: 16 BRPM | DIASTOLIC BLOOD PRESSURE: 46 MMHG | HEART RATE: 70 BPM | OXYGEN SATURATION: 99 % | SYSTOLIC BLOOD PRESSURE: 143 MMHG

## 2024-04-19 DIAGNOSIS — C61 PROSTATE CANCER (H): Primary | ICD-10-CM

## 2024-04-19 PROCEDURE — A9607 HC RX 344: HCPCS | Performed by: INTERNAL MEDICINE

## 2024-04-19 PROCEDURE — 36591 DRAW BLOOD OFF VENOUS DEVICE: CPT

## 2024-04-19 PROCEDURE — 999N000248 HC STATISTIC IV INSERT WITH US BY RN

## 2024-04-19 PROCEDURE — 344N000001 HC RX 344: Performed by: INTERNAL MEDICINE

## 2024-04-19 PROCEDURE — 79101 NUCLEAR RX IV ADMIN: CPT | Mod: 26 | Performed by: RADIOLOGY

## 2024-04-19 PROCEDURE — 79101 NUCLEAR RX IV ADMIN: CPT

## 2024-04-19 RX ORDER — LORAZEPAM 2 MG/ML
.5-1 INJECTION INTRAMUSCULAR EVERY 6 HOURS PRN
Status: DISCONTINUED | OUTPATIENT
Start: 2024-04-19 | End: 2024-04-20 | Stop reason: HOSPADM

## 2024-04-19 RX ORDER — ALBUTEROL SULFATE 90 UG/1
1-2 AEROSOL, METERED RESPIRATORY (INHALATION)
Status: DISCONTINUED | OUTPATIENT
Start: 2024-04-19 | End: 2024-04-20 | Stop reason: HOSPADM

## 2024-04-19 RX ORDER — EPINEPHRINE 1 MG/ML
0.3 INJECTION, SOLUTION, CONCENTRATE INTRAVENOUS EVERY 5 MIN PRN
Status: DISCONTINUED | OUTPATIENT
Start: 2024-04-19 | End: 2024-04-20 | Stop reason: HOSPADM

## 2024-04-19 RX ORDER — DIPHENHYDRAMINE HYDROCHLORIDE 50 MG/ML
50 INJECTION INTRAMUSCULAR; INTRAVENOUS
Status: DISCONTINUED | OUTPATIENT
Start: 2024-04-19 | End: 2024-04-20 | Stop reason: HOSPADM

## 2024-04-19 RX ORDER — LORAZEPAM 0.5 MG/1
.5-1 TABLET ORAL EVERY 6 HOURS PRN
Status: DISCONTINUED | OUTPATIENT
Start: 2024-04-19 | End: 2024-04-20 | Stop reason: HOSPADM

## 2024-04-19 RX ORDER — PROCHLORPERAZINE MALEATE 5 MG
5 TABLET ORAL EVERY 6 HOURS PRN
Status: DISCONTINUED | OUTPATIENT
Start: 2024-04-19 | End: 2024-04-20 | Stop reason: HOSPADM

## 2024-04-19 RX ORDER — METHYLPREDNISOLONE SODIUM SUCCINATE 125 MG/2ML
125 INJECTION, POWDER, LYOPHILIZED, FOR SOLUTION INTRAMUSCULAR; INTRAVENOUS
Status: DISCONTINUED | OUTPATIENT
Start: 2024-04-19 | End: 2024-04-20 | Stop reason: HOSPADM

## 2024-04-19 RX ORDER — ONDANSETRON 8 MG/1
8 TABLET, FILM COATED ORAL
Status: DISCONTINUED | OUTPATIENT
Start: 2024-04-19 | End: 2024-04-20 | Stop reason: HOSPADM

## 2024-04-19 RX ORDER — MEPERIDINE HYDROCHLORIDE 25 MG/ML
25 INJECTION INTRAMUSCULAR; INTRAVENOUS; SUBCUTANEOUS EVERY 30 MIN PRN
Status: DISCONTINUED | OUTPATIENT
Start: 2024-04-19 | End: 2024-04-20 | Stop reason: HOSPADM

## 2024-04-19 RX ORDER — ALBUTEROL SULFATE 0.83 MG/ML
2.5 SOLUTION RESPIRATORY (INHALATION)
Status: DISCONTINUED | OUTPATIENT
Start: 2024-04-19 | End: 2024-04-20 | Stop reason: HOSPADM

## 2024-04-19 RX ADMIN — LUTETIUM LU 177 VIPIVOTIDE TETRAXETAN 200 MILLICURIE: 27 INJECTION, SOLUTION INTRAVENOUS at 13:34

## 2024-04-19 NOTE — PROGRESS NOTES
[facility] Nursing Note:    Patient presents today for Pluvicto therapy, dose 6 of  6  Patient seen by provider today: No   present during visit today: NOT APPLICABLE      Intravenous Access:  Peripheral IV placed     Treatment Conditions:  Labs done on  03/29/2024    Results reviewed, labs Met treatment parameters, ok to proceed with treatment.           Post Infusion Assessment:  Patient tolerated infusion without incident.    PIV - No evidence of extravasations, access discontinued per protocol.         Discharge Plan:   Patient will return as scheduled for next appointment.   Patient discharged at 1400pm  in stable condition accompanied by: Self  Departure Mode: Ambulatory

## 2024-06-16 NOTE — PROGRESS NOTES
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   S/P 6 cycles of Pluvitco with a grand slam response. His PSA has dropped from 173 to 5.5. our work here is finished!  Moreover, it has come at the cost of minimal hematologic side effects. Jeannette could not be more delighted as are his treating team here at the [facility]. The plan was always to do a total of 6 cycles and then refer him back to his primary oncologist. We are there now.  Jeannette did have some important questions today that we addressed.  He wanted to know if he was cured.  I told him unfortunately that with metastatic cancer, cure is not possible.  The goals of therapy for him are to help him live as long as possible, while at the same time have the highest quality of life.  Clearly the Pluvicto is going to help him live longer.  But this does not mean he should stop therapy.  I recommend that he continue with androgen deprivation therapy.  Usually people on denosumab at this point go to every 3 month injections because sometimes continuous denosumab for many years can result in paradoxical bone brittleness.  The Lupron has been very uncomfortable for him so perhaps he could go to subcutaneous Eligard.  I will leave this to his primary oncologist.    PLAN:  Continue follow-up with Dr. Vang, including ADT and denosumab.  RTC with us on an as needed basis.  I will always be there for Jeannette in the future if he ever needs my input regarding anything related to his cancer.  What a privilege it has been to take care of him.    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 Maria Teresa Vang ([facility])  Current Treatment and intent: referred for palliative 5th line Ksenia-177-PSMA    Treatment Summary:   20 to present: leuprolide q3m  20 to present: zoledronic acid monthly  20 to 20: docetaxel x11 cycles (PSA 10,824 ? 16.47)  21 to 22: bicalutamide  22 to 22: enzalutamide  22 to 23: cabazitaxel, carboplatin, prednisone x9 cycles  Pluvicto times 6 cycles, 9/15/2023 through 2024. , and last given 3/8/2024. PSA just prior (23) was 173.5.  It was 5.35 when last measured 2024.    Interval History:   Jeannette is back.   He is feeling very well. He can't believe how much better pluvicto is compared to chemotherapy. He is thankful for this.   Eating and drinking well. He is gaining weight which is has been trying to do.   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 Wonders if it will ever go away (it may not).  Continues to have dry mouth.  Wants to know how long he may have it.  (Unfortunately this may persist for some time)  Breathing is stable. He has asthma. No chest pain.   No fevers or chills.   He denies recent falls.    No nausea or vomiting.   No urinary or bowel concerns.    No bone pains.      ROS: 10 point ROS neg other than the symptoms noted above in the HPI.      Social History:   Jeannette grew up on a [place] farm outside of [place], [place].  He really had a lot of fun, as well as did a lot of hunting and fishing.  He met a woman named Joya on a blind date, and she hung in there with  "him for 2 years.  He really was not sure he wanted to get .  Finally, one afternoon, while watching the water flow over the [place], he decided \"Jeannette, grow up.  You do not want to lose the her.\"  He then asked Joya to  him.  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  She did all of the tillage, which was something he did not like very much.  She was the love of his life.  They have 1 son Natanael.  He also told me today about time while growing up and that while dehorning a heifer, he was impaled with the horn going all the way through his abdomen to the back bone.  His father did not care very much.  This did not endear his father to Jeannette.    Hem/onc History:   Edit Oncology History  Oncology History Overview Note   Jeannette initially presented to his PCP with a lump on the left side of his neck on 9/16/2019.  He underwent a CT of the neck 9/20/19 that showed left cervical adenopathy which was subsequently biopsied 10/4/2019 with results consistent with metastatic prostate adenocarcinoma.  At that time, his PSA was 8,970 on 10/10/2019.  He subsequently underwent a bone scan and CT AP on 11/20/2019 that showed widespread bony metastatic disease and a large pelvic mass directly invading the urinary bladder with bulky retroperitoneal and pelvic sidewall lymphadenopathy.    He started first-line palliative treatment with leuprolide and docetaxel on 1/29/2020 and was treated with 11 total cycles of docetaxel ending on 8/28/2020. A restaging CT CAP and bone scan 9/16/20 showed apparent resolution of the invasive prostate mass and lymphadenopathy, and improved but persistent diffuse osseous metastatic disease. His PSA decreased from a peak of 10,824 down to a sherrie of 16.47 on 6/9/2021 in response to this treatment.      His PSA then slowly drifted up to 30.97 on 11/12/2021 at which time palliative second line bicalutamide was added to leuprolide.  His PSA " unfortunately continued to rise to 52.30 on 2/14/2022 for which she was switched from bicalutamide to palliative third line enzalutamide.  His PSA decreased to a sherrie of 16.83 on 7/15/2022 in response to enzalutamide, then started to rise again to 28.72 on 8/11/2022 for which he was changed from enzalutamide to palliative fourth line cabazitaxel, carboplatin, and prednisone.  He received a total of 9 cycles of cabazitaxel, carboplatin, and prednisone ending on 4/24/2023 during which time his PSA was stable in the 40-50 range, however started to increase to the 70-90 range after completing chemotherapy.  He therefore underwent a PSMA PET-CT scan on 7/6/2023 that showed hypermetabolic activity in the majority the prostate and widespread osseous metastatic disease for which she was referred to [facility] for lutetium-177-PSMA therapy.     Prostate cancer (H)   10/4/2019 Pathology    Left neck lymph node biopsy  [facility] overread:  Final Diagnosis   CASE FROM [place], [place] ([id number], OBTAINED 10/04/2019):  LYMPH NODE, LEFT NECK, CORE BIOPSY:  -METASTATIC PROSTATE ADENOCARCINOMA, see comment.        10/10/2019 Tumor Markers    Date PSA Treatment   10/10/2019 8,970.00 High      1/29/2020 10,824.68 High  Started leuprolide, docetaxel    2/19/2020 3,497.46 High       3/11/2020 1,256.01 High       4/2/2020 727.72 High     4/24/2020 633.57 High     5/15/2020 471.45 High     6/4/2020 332.91 High     6/26/2020 229.82 High     7/17/2020 185.36 High     8/7/2020 149.70 High     8/28/2020 124.63 High  Docetaxel x11 cycles completed   12/4/2020 42.69 High     3/15/2021 21.50 High     4/12/2021 17.52 High     5/14/2021 16.84 High     6/9/2021 16.47 High     8/20/2021 19.90 High     9/20/2021 22.72 High     10/20/2021 29.24 High     11/12/2021 30.97 High  Added bicalutamide to leuprolide   12/16/2021 29.96 High     1/18/2022 35.33 High     2/14/2022 52.30 High  Changed bicalutamide to enzalutamide   4/22/2022 19.22 High      5/20/2022 17.88 High     6/17/2022 17.92 High     7/15/2022 16.83 High     8/12/2022 19.11 High     9/12/2022 22.04 High     10/11/2022 25.04 High     11/8/2022 28.72 High  Changed enzalutamide to cabazitaxel, carboplatin   11/29/2022 42.73 High     12/20/2022 53.16 High     1/10/2023 52.72 High     1/31/2023 47.75 High     3/14/2023 47.78 High     4/3/2023 50.89 High     4/24/2023 93.83 High  Cabazitaxel, carboplatin x9 cycles completed   5/15/2023 73.02 High     6/26/2023 81.78 High     8/7/2023 117.08 High          7/6/2023 Imaging    PSMA PET  IMPRESSION:   1. Hypermetabolic activity involving a majority of the prostate concerning for   residual disease.   2. Hypermetabolic activity involving numerous osseous structures as discussed   above consistent with metastatic disease.      9/15/2023 -  Chemotherapy    IP ONC Prostate Cancer (PMSA+) - Lutetium Ksenia-177 vipivotide tetraxetan  Plan Provider: Faby Toro MD  Treatment goal: Palliative  Line of treatment: [No plan line of treatment]     Subjective   Past Medical History:   Diagnosis Date    Prostate cancer (H) 08/24/2023     Past Surgical History:   Procedure Laterality Date    IR LYMPH NODE BIOPSY  10/4/2019     Social History     Socioeconomic History    Marital status: Single   Tobacco Use    Smoking status: Never     Passive exposure: Never    Smokeless tobacco: Current     Types: Chew, Snuff   Substance and Sexual Activity    Alcohol use: Yes    Drug use: Never     Social Determinants of Health     Financial Resource Strain: Low Risk  (1/19/2023)    Received from [email or web address], [facility] and [facility]    Overall Financial Resource Strain (CARDIA)     Difficulty of Paying Living Expenses: Not hard at all   Food Insecurity: No Food Insecurity (1/19/2023)    [facility], [facility] and [facility]    Hunger Vital Sign     Worried About Running Out of Food in the Last Year: Never true      Ran Out of Food in the Last Year: Never true   Transportation Needs: No Transportation Needs (1/19/2023)    Received from [facility], [facility]    PRAPARE - Transportation     Lack of Transportation (Medical): No     Lack of Transportation (Non-Medical): No   Physical Activity: Sufficiently Active (1/19/2023)    Received from [facility], [facility]    Exercise Vital Sign     Days of Exercise per Week: 7 days     Minutes of Exercise per Session: 80 min   Stress: No Stress Concern Present (1/19/2023)    Received from [facility], [facility]    [employer] [place] [employer] - Occupational Stress Questionnaire     Feeling of Stress : Not at all   Social Connections: Moderately Integrated (1/19/2023)    Received from [facility], [facility]    Social Connection and Isolation Panel [NHANES]     Frequency of Communication with Friends and Family: More than three times a week     Frequency of Social Gatherings with Friends and Family: Once a week     Attends Faith Services: More than 4 times per year     Active Member of Clubs or Organizations: Yes     Attends Club or Organization Meetings: More than 4 times per year     Marital Status:    Interpersonal Safety: Not At Risk (4/26/2024)    Received from [facility]    Intimate Partner Violence     Are you in a relationship where you are physically hurt, threatened and/or made to feel afraid?: No   Housing Stability: High Risk (1/19/2023)    Received from [facility], [facility]    Housing Stability     In the last 12 months, was there a time when you were not able to pay the mortgage or rent on time?: No     In the last 12 months, how many places have you lived?: 0     In the last 12 months, was there a time when you  did not have a steady place to sleep or slept in a shelter (including now)?: Yes      Family History   Problem Relation Age of Onset    No Known Problems Son        Current Outpatient Medications   Medication Sig Dispense Refill    calcium carbonate (OS-KIMBERLY) 500 MG tablet Take 1 tablet by mouth 2 times daily Unsure of dosage      cod liver oil CAPS capsule Take 2 capsules by mouth daily      cyanocobalamin (VITAMIN B-12) 100 MCG tablet Take 100 mcg by mouth daily      diphenhydrAMINE (BENADRYL) 25 MG capsule Take 25 mg by mouth every 6 hours as needed for allergies (Patient not taking: Reported on 2023)      loratadine (CLARITIN REDITABS) 10 MG ODT Take 10 mg by mouth daily Over the counter med (used instead of benadryl)      magnesium 250 MG tablet Take 1 tablet by mouth daily      pyridOXINE (VITAMIN B6) 100 MG TABS Take 25 mg by mouth daily      vitamin C with B complex (B COMPLEX-C) tablet Take 1 tablet by mouth daily (Patient not taking: Reported on 2023)       No current facility-administered medications for this visit.        Objective   ECO-1  Physical Exam:   There were no vitals taken for this visit.  General: No acute distress  HEENT: Sclera anicteric. Oral mucosa pink and moist.  No mucositis or thrush  Lymph: No lymphadenopathy in neck  Heart: Subtle murmur. Regular, rate, and rhythm.   Lungs: Clear to ascultation bilaterally  Abdomen: Positive bowel sounds. Soft, non-distended, non-tender. No organomegaly or mass.   Extremities: no lower extremity edema  Neuro: Cranial nerves grossly intact  Rash: none    Data:   Labs are in Care Everywhere and have been reviewed--SB

## 2024-06-17 ENCOUNTER — ONCOLOGY VISIT (OUTPATIENT)
Dept: ONCOLOGY | Facility: CLINIC | Age: 84
End: 2024-06-17
Attending: INTERNAL MEDICINE
Payer: COMMERCIAL

## 2024-06-17 VITALS
WEIGHT: 132 LBS | BODY MASS INDEX: 21.47 KG/M2 | HEART RATE: 69 BPM | OXYGEN SATURATION: 99 % | DIASTOLIC BLOOD PRESSURE: 76 MMHG | TEMPERATURE: 98 F | SYSTOLIC BLOOD PRESSURE: 126 MMHG | RESPIRATION RATE: 16 BRPM

## 2024-06-17 DIAGNOSIS — C61 PROSTATE CANCER (H): Primary | ICD-10-CM

## 2024-06-17 PROCEDURE — G0463 HOSPITAL OUTPT CLINIC VISIT: HCPCS | Performed by: INTERNAL MEDICINE

## 2024-06-17 PROCEDURE — 99214 OFFICE O/P EST MOD 30 MIN: CPT | Performed by: INTERNAL MEDICINE

## 2024-06-17 ASSESSMENT — PAIN SCALES - GENERAL: PAINLEVEL: NO PAIN (0)

## 2024-06-17 NOTE — LETTER
6/17/2024      Liborio Lira  [address]  [facility] [address]      Dear Colleague,    Thank you for referring your patient, Liborio Lira, to the [facility]. Please see a copy of my visit note below.    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   S/P 6 cycles of Pluvitco with a grand slam response. His PSA has dropped from 173 to 5.5. our work here is finished!  Moreover, it has come at the cost of minimal hematologic side effects. Jeannette could not be more delighted as are his treating team here at the [facility]. The plan was always to do a total of 6 cycles and then refer him back to his primary oncologist. We are there now.  Jeannette did have some important questions today that we addressed.  He wanted to know if he was cured.  I told him unfortunately that with metastatic cancer, cure is not possible.  The goals of therapy for him are to help him live as long as possible, while at the same time have the highest quality of life.  Clearly the Pluvicto is going to help him live longer.  But this does not mean he should stop therapy.  I recommend that he continue with androgen deprivation therapy.  Usually people on denosumab at this point go to every 3 month injections because sometimes continuous denosumab for many years can result in paradoxical bone brittleness.  The Lupron has been very uncomfortable for him so perhaps he could go to subcutaneous Eligard.  I will leave this to his primary oncologist.    PLAN:  Continue follow-up with Dr. Vang, including ADT and denosumab.  RTC with us on an as needed basis.  I will always be there for Jeannette in the future if he ever needs my input regarding anything related to his cancer.  What a privilege it has been to take care of him.    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 Maria Teresa Vang ([facility])  Current Treatment and intent: referred for palliative 5th line Ksenia-177-PSMA    Treatment Summary:   20 to present: leuprolide q3m  20 to present: zoledronic acid monthly  20 to 20: docetaxel x11 cycles (PSA 10,824 ? 16.47)  21 to 22: bicalutamide  22 to 22: enzalutamide  22 to 23: cabazitaxel, carboplatin, prednisone x9 cycles  Pluvicto times 6 cycles, 9/15/2023 through 2024. , and last given 3/8/2024. PSA just prior (23) was 173.5.  It was 5.35 when last measured 2024.    Interval History:   Jeannette is back.   He is feeling very well. He can't believe how much better pluvicto is compared to chemotherapy. He is thankful for this.   Eating and drinking well. He is gaining weight which is has been trying to do.   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 Wonders if it will ever go away (it may not).  Continues to have dry mouth.  Wants to know how long he may have it.  (Unfortunately this may persist for some time)  Breathing is stable. He has asthma. No chest pain.   No fevers or chills.   He denies recent falls.    No nausea or vomiting.   No urinary or bowel concerns.    No bone pains.      ROS: 10 point ROS neg other than the symptoms noted above in the HPI.      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  He really had a lot of fun, as well as did a lot of hunting and fishing.  He met a woman named Joya on a blind date, and she hung in there with him for 2 years.  He really was not sure he wanted to get .  Finally, one afternoon, while watching the water flow over the [place], he decided \"Jeannette, grow up.  You do not want to lose the her.\"  He then asked Joya to  him.  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  She did all of the tillage, which was something he did not like very much.  She was the love of his life.  They have 1 son Natanael.  He also told me today about time while growing up and that while dehorning a heifer, he was impaled with the horn going all the way through his abdomen to the back bone.  His father did not care very much.  This did not endear his father to Jeannette.    Hem/onc History:   Edit Oncology History  Oncology History Overview Note   Jeannette initially presented to his PCP with a lump on the left side of his neck on 9/16/2019.  He underwent a CT of the neck 9/20/19 that showed left cervical adenopathy which was subsequently biopsied 10/4/2019 with results consistent with metastatic prostate adenocarcinoma.  At that time, his PSA was 8,970 on 10/10/2019.  He subsequently underwent a bone scan and CT AP on 11/20/2019 that showed widespread bony metastatic disease and a large pelvic mass directly invading the urinary bladder with bulky retroperitoneal and pelvic sidewall lymphadenopathy.    He started first-line palliative treatment with leuprolide and docetaxel on 1/29/2020 and was treated with 11 total cycles of docetaxel ending on 8/28/2020. A restaging CT CAP and bone scan 9/16/20 showed apparent resolution of the invasive prostate mass and lymphadenopathy, and improved but persistent diffuse osseous metastatic disease. His PSA decreased from a peak of " 10,824 down to a sherrie of 16.47 on 6/9/2021 in response to this treatment.      His PSA then slowly drifted up to 30.97 on 11/12/2021 at which time palliative second line bicalutamide was added to leuprolide.  His PSA unfortunately continued to rise to 52.30 on 2/14/2022 for which she was switched from bicalutamide to palliative third line enzalutamide.  His PSA decreased to a sherrie of 16.83 on 7/15/2022 in response to enzalutamide, then started to rise again to 28.72 on 8/11/2022 for which he was changed from enzalutamide to palliative fourth line cabazitaxel, carboplatin, and prednisone.  He received a total of 9 cycles of cabazitaxel, carboplatin, and prednisone ending on 4/24/2023 during which time his PSA was stable in the 40-50 range, however started to increase to the 70-90 range after completing chemotherapy.  He therefore underwent a PSMA PET-CT scan on 7/6/2023 that showed hypermetabolic activity in the majority the prostate and widespread osseous metastatic disease for which she was referred to [facility] for lutetium-177-PSMA therapy.     Prostate cancer (H)   10/4/2019 Pathology    Left neck lymph node biopsy  [facility] overread:  Final Diagnosis   CASE FROM [place], [place] ([id number], OBTAINED 10/04/2019):  LYMPH NODE, LEFT NECK, CORE BIOPSY:  -METASTATIC PROSTATE ADENOCARCINOMA, see comment.        10/10/2019 Tumor Markers    Date PSA Treatment   10/10/2019 8,970.00 High      1/29/2020 10,824.68 High  Started leuprolide, docetaxel    2/19/2020 3,497.46 High       3/11/2020 1,256.01 High       4/2/2020 727.72 High     4/24/2020 633.57 High     5/15/2020 471.45 High     6/4/2020 332.91 High     6/26/2020 229.82 High     7/17/2020 185.36 High     8/7/2020 149.70 High     8/28/2020 124.63 High  Docetaxel x11 cycles completed   12/4/2020 42.69 High     3/15/2021 21.50 High     4/12/2021 17.52 High     5/14/2021 16.84 High     6/9/2021 16.47 High     8/20/2021 19.90 High     9/20/2021 22.72 High      10/20/2021 29.24 High     11/12/2021 30.97 High  Added bicalutamide to leuprolide   12/16/2021 29.96 High     1/18/2022 35.33 High     2/14/2022 52.30 High  Changed bicalutamide to enzalutamide   4/22/2022 19.22 High     5/20/2022 17.88 High     6/17/2022 17.92 High     7/15/2022 16.83 High     8/12/2022 19.11 High     9/12/2022 22.04 High     10/11/2022 25.04 High     11/8/2022 28.72 High  Changed enzalutamide to cabazitaxel, carboplatin   11/29/2022 42.73 High     12/20/2022 53.16 High     1/10/2023 52.72 High     1/31/2023 47.75 High     3/14/2023 47.78 High     4/3/2023 50.89 High     4/24/2023 93.83 High  Cabazitaxel, carboplatin x9 cycles completed   5/15/2023 73.02 High     6/26/2023 81.78 High     8/7/2023 117.08 High          7/6/2023 Imaging    PSMA PET  IMPRESSION:   1. Hypermetabolic activity involving a majority of the prostate concerning for   residual disease.   2. Hypermetabolic activity involving numerous osseous structures as discussed   above consistent with metastatic disease.      9/15/2023 -  Chemotherapy    IP ONC Prostate Cancer (PMSA+) - Lutetium Ksenia-177 vipivotide tetraxetan  Plan Provider: Faby Toro MD  Treatment goal: Palliative  Line of treatment: [No plan line of treatment]     Subjective  Past Medical History:   Diagnosis Date     Prostate cancer (H) 08/24/2023     Past Surgical History:   Procedure Laterality Date     IR LYMPH NODE BIOPSY  10/4/2019     Social History     Socioeconomic History     Marital status: Single   Tobacco Use     Smoking status: Never     Passive exposure: Never     Smokeless tobacco: Current     Types: Chew, Snuff   Substance and Sexual Activity     Alcohol use: Yes     Drug use: Never     Social Determinants of Health     Financial Resource Strain: Low Risk  (1/19/2023)    [facility]    Overall Financial Resource Strain (CARDIA)      Difficulty of Paying Living Expenses: Not hard at  all   Food Insecurity: No Food Insecurity (1/19/2023)    [facility]    Hunger Vital Sign      Worried About Running Out of Food in the Last Year: Never true      Ran Out of Food in the Last Year: Never true   Transportation Needs: No Transportation Needs (1/19/2023)    Received from [facility], [facility]    PRAPARE - Transportation      Lack of Transportation (Medical): No      Lack of Transportation (Non-Medical): No   Physical Activity: Sufficiently Active (1/19/2023)    [facility] from [facility], [facility]    Exercise Vital Sign      Days of Exercise per Week: 7 days      Minutes of Exercise per Session: 80 min   Stress: No Stress Concern Present (1/19/2023)    Received from [facility], [facility]    [place] [employer] - Occupational Stress Questionnaire      Feeling of Stress : Not at all   Social Connections: Moderately Integrated (1/19/2023)    Received from [facility], [facility]    Social Connection and Isolation Panel [NHANES]      Frequency of Communication with Friends and Family: More than three times a week      Frequency of Social Gatherings with Friends and Family: Once a week      Attends Druze Services: More than 4 times per year      Active Member of Clubs or Organizations: Yes      Attends Club or Organization Meetings: More than 4 times per year      Marital Status:    Interpersonal Safety: Not At Risk (4/26/2024)    Received from [facility]    Intimate Partner Violence      Are you in a relationship where you are physically hurt, threatened and/or made to feel afraid?: No   Housing Stability: High Risk (1/19/2023)    Received from [facility], [facility]     Housing Stability      In the last 12 months, was there a time when you were not able to pay the mortgage or rent on time?: No      In the last 12 months, how many places have you lived?: 0      In the last 12 months, was there a time when you did not have a steady place to sleep or slept in a shelter (including now)?: Yes      Family History   Problem Relation Age of Onset     No Known Problems Son        Current Outpatient Medications   Medication Sig Dispense Refill     calcium carbonate (OS-KIMBERLY) 500 MG tablet Take 1 tablet by mouth 2 times daily Unsure of dosage       cod liver oil CAPS capsule Take 2 capsules by mouth daily       cyanocobalamin (VITAMIN B-12) 100 MCG tablet Take 100 mcg by mouth daily       diphenhydrAMINE (BENADRYL) 25 MG capsule Take 25 mg by mouth every 6 hours as needed for allergies (Patient not taking: Reported on 2023)       loratadine (CLARITIN REDITABS) 10 MG ODT Take 10 mg by mouth daily Over the counter med (used instead of benadryl)       magnesium 250 MG tablet Take 1 tablet by mouth daily       pyridOXINE (VITAMIN B6) 100 MG TABS Take 25 mg by mouth daily       vitamin C with B complex (B COMPLEX-C) tablet Take 1 tablet by mouth daily (Patient not taking: Reported on 2023)       No current facility-administered medications for this visit.        Objective  ECO-1  Physical Exam:   There were no vitals taken for this visit.  General: No acute distress  HEENT: Sclera anicteric. Oral mucosa pink and moist.  No mucositis or thrush  Lymph: No lymphadenopathy in neck  Heart: Subtle murmur. Regular, rate, and rhythm.   Lungs: Clear to ascultation bilaterally  Abdomen: Positive bowel sounds. Soft, non-distended, non-tender. No organomegaly or mass.   Extremities: no lower extremity edema  Neuro: Cranial nerves grossly intact  Rash: none    Data:   Labs are in Care Everywhere and have been reviewed--SB                                           Again, thank you for allowing me  to participate in the care of your patient.        Sincerely,        Nav Mcdonald MD

## 2024-06-17 NOTE — NURSING NOTE
"Oncology Rooming Note    June 17, 2024 1:32 PM   Liborio Lira is a 83 year old male who presents for:    Chief Complaint   Patient presents with    Oncology Clinic Visit     Prostate CA     Initial Vitals: /76   Pulse 69   Temp 98  F (36.7  C) (Oral)   Resp 16   Wt 59.9 kg (132 lb)   SpO2 99%   BMI 21.47 kg/m   Estimated body mass index is 21.47 kg/m  as calculated from the following:    Height as of 4/8/24: 1.67 m (5' 5.75\").    Weight as of this encounter: 59.9 kg (132 lb). Body surface area is 1.67 meters squared.  No Pain (0) Comment: Data Unavailable   No LMP for male patient.  Allergies reviewed: Yes  Medications reviewed: Yes    Medications: Medication refills not needed today.  Pharmacy name entered into [employer]: [id number] - [place] [id number]    Frailty Screening:   Is the patient here for a new oncology consult visit in cancer care? 2. No      Clinical concerns:        Viky Love              "